# Patient Record
Sex: MALE | Race: WHITE | NOT HISPANIC OR LATINO | Employment: FULL TIME | URBAN - METROPOLITAN AREA
[De-identification: names, ages, dates, MRNs, and addresses within clinical notes are randomized per-mention and may not be internally consistent; named-entity substitution may affect disease eponyms.]

---

## 2021-08-20 ENCOUNTER — OFFICE VISIT (OUTPATIENT)
Dept: FAMILY MEDICINE CLINIC | Facility: CLINIC | Age: 61
End: 2021-08-20
Payer: COMMERCIAL

## 2021-08-20 VITALS
TEMPERATURE: 98.6 F | HEART RATE: 58 BPM | BODY MASS INDEX: 23.78 KG/M2 | WEIGHT: 151.5 LBS | SYSTOLIC BLOOD PRESSURE: 112 MMHG | DIASTOLIC BLOOD PRESSURE: 62 MMHG | HEIGHT: 67 IN | OXYGEN SATURATION: 99 %

## 2021-08-20 DIAGNOSIS — Z12.5 SCREENING FOR PROSTATE CANCER: ICD-10-CM

## 2021-08-20 DIAGNOSIS — Z13.0 SCREENING, ANEMIA, DEFICIENCY, IRON: ICD-10-CM

## 2021-08-20 DIAGNOSIS — M25.551 RIGHT HIP PAIN: Primary | ICD-10-CM

## 2021-08-20 DIAGNOSIS — Z13.220 SCREENING FOR LIPID DISORDERS: ICD-10-CM

## 2021-08-20 DIAGNOSIS — Z11.59 ENCOUNTER FOR HEPATITIS C SCREENING TEST FOR LOW RISK PATIENT: ICD-10-CM

## 2021-08-20 DIAGNOSIS — Z12.11 SCREEN FOR COLON CANCER: ICD-10-CM

## 2021-08-20 DIAGNOSIS — Z13.29 SCREENING FOR THYROID DISORDER: ICD-10-CM

## 2021-08-20 DIAGNOSIS — Z13.1 SCREENING FOR DIABETES MELLITUS: ICD-10-CM

## 2021-08-20 PROCEDURE — 1036F TOBACCO NON-USER: CPT | Performed by: FAMILY MEDICINE

## 2021-08-20 PROCEDURE — 3725F SCREEN DEPRESSION PERFORMED: CPT | Performed by: FAMILY MEDICINE

## 2021-08-20 PROCEDURE — 99203 OFFICE O/P NEW LOW 30 MIN: CPT | Performed by: FAMILY MEDICINE

## 2021-08-20 PROCEDURE — 3008F BODY MASS INDEX DOCD: CPT | Performed by: FAMILY MEDICINE

## 2021-08-20 RX ORDER — MULTIVITAMIN
1 TABLET ORAL DAILY
COMMUNITY

## 2021-08-20 NOTE — PROGRESS NOTES
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 ft of snow in winter , shoveling and doing farm work  pt feels is hip goes out which causes pain  accupuncture gives some relief   tens unit some relief   Dizziness     last couple days  fbw due   colonoscopy due  Patient ID: Jaida Nassar is a 62 y o  male  The pt is new to the office, here to establish care because he has had months of right hip pain radiating into the leg    Going on since the snow last winter  He was doing a lot of shoveling  He does a lot of farm work  [employer] like a joint is popping in and out  He has had acupuncture with minimal relief  TENS unit gives him some relief  Has done yoga/reiki    Hurts the most when he lies down and tried to do a leg raise - feels like the hip joint pops out  Has to "push it back in"  Hurts when he stands from a seated position in this same way  Exacerbated after driving for a while    He does not really have back pain  Sometimes has intermittent pains related to his work  Pain into the leg is not electric shock like, more achy      The following portions of the patient's history were reviewed and updated as appropriate: allergies, current medications, past family history, past medical history, past social history, past surgical history and problem list     Review of Systems          Objective:  Vitals:    08/20/21 1527   BP: 112/62   Pulse: 58   Temp: 98 6 °F (37 °C)   SpO2: 99%   Weight: 68 7 kg (151 lb 8 oz)   Height: 5' 6 5" (1 689 m)      Physical Exam  Constitutional:       General: He is not in acute distress  Appearance: Normal appearance  He is not ill-appearing     Musculoskeletal:      Comments:   No tenderness over the hip joint itself but when he goes to stand up he feels a click and you can see him hesitate, he feels like he has to push his hip anteriorly to get it to click and place   Skin: General: Skin is warm and dry  Findings: No erythema or rash  Neurological:      General: No focal deficit present  Mental Status: He is alert and oriented to person, place, and time  Cranial Nerves: No cranial nerve deficit  Gait: Gait normal    Psychiatric:         Mood and Affect: Mood normal          Behavior: Behavior normal          Thought Content: Thought content normal          Judgment: Judgment normal            Assessment/Plan:    No problem-specific Assessment & Plan notes found for this encounter  I am going to have the patient get an x-ray and go to physical therapy  If things are not getting better with physical therapy or there is something significantly abnormal on the x-ray I would refer him to Orthopedics  He is due for a physical and lab work which was ordered today  Diagnoses and all orders for this visit:    Right hip pain  -     XR hip/pelv 2-3 vws right if performed; Future  -     Ambulatory referral to Physical Therapy; Future    Screening, anemia, deficiency, iron  -     CBC and differential; Future  -     CBC and differential    Screening for diabetes mellitus  -     Comprehensive metabolic panel; Future  -     Comprehensive metabolic panel    Screening for lipid disorders  -     Lipid Panel with Direct LDL reflex; Future  -     Lipid Panel with Direct LDL reflex    Screening for thyroid disorder  -     TSH, 3rd generation with Free T4 reflex; Future  -     TSH, 3rd generation with Free T4 reflex    Encounter for hepatitis C screening test for low risk patient  -     Hepatitis C antibody; Future  -     Hepatitis C antibody    Screening for prostate cancer  -     PSA Total (Reflex To Free); Future  -     PSA Total (Reflex To Free)    Screen for colon cancer  -     Ambulatory referral for colonoscopy; Future    Other orders  -     Multiple Vitamin (multivitamin) tablet; Take 1 tablet by mouth daily  -     Probiotic Product (PRO-BIOTIC BLEND PO);  Take by mouth  -     MAGNESIUM PO; Take by mouth

## 2021-08-31 ENCOUNTER — EVALUATION (OUTPATIENT)
Dept: PHYSICAL THERAPY | Facility: CLINIC | Age: 61
End: 2021-08-31
Payer: COMMERCIAL

## 2021-08-31 DIAGNOSIS — M25.551 RIGHT HIP PAIN: Primary | ICD-10-CM

## 2021-08-31 PROCEDURE — 97161 PT EVAL LOW COMPLEX 20 MIN: CPT | Performed by: PHYSICAL THERAPIST

## 2021-08-31 PROCEDURE — 97110 THERAPEUTIC EXERCISES: CPT | Performed by: PHYSICAL THERAPIST

## 2021-08-31 NOTE — PROGRESS NOTES
PT Evaluation     Today's date: 2021  Patient name: Jorge Luis Wilson  : 1960  MRN: [id number]  Referring provider: Fede Pacheco MD  Dx:   Encounter Diagnosis     ICD-10-CM    1  Right hip pain  M25 551                   Assessment  Assessment details: Jorge Luis Wilson is a 62 y o  male who presents to physical therapy with diagnosis of right hip pain   Riddhi Bering presents with pain and limitations in range of motion, strength, joint mobility, flexibility, and functional ability  The current limitations are affecting Jens's ability to function at prior level  He will benefit from skilled physical therapy to address the current impairments and functional limitations to enable him to return to daily activities at maximal level  Thank you for the referral     Impairments: abnormal or restricted ROM, activity intolerance, impaired balance, impaired physical strength, lacks appropriate home exercise program, weight-bearing intolerance and poor posture     Symptom irritability: lowUnderstanding of Dx/Px/POC: good   Prognosis: good    Goals  Patient will decrease pain at worst to 4/10  Patient will decrease pain at worst to 2/10  Patient will improve hamstring 90/90 to 35 degrees on R  Patient will improve LE strength 1/2 grade in all deficit planes  Patient will report ability to complete ADLs and household chores without pain afterwards  Patient will report ability to stand after sitting for prolonged period without pain    Patient will be independent with comprehensive HEP    Plan  Patient would benefit from: skilled physical therapy  Planned modality interventions: cryotherapy and thermotherapy: hydrocollator packs  Planned therapy interventions: manual therapy, neuromuscular re-education, patient education, therapeutic activities, therapeutic exercise, therapeutic training and home exercise program  Frequency: 2x week  Duration in weeks: 6  Treatment plan discussed with: patient        Subjective Evaluation    History of Present Illness  Mechanism of injury: Chaka Oswald is a 62 y o  male presenting to physical therapy on 21 with primary complaints of R hip pain  He mentions it started in the winter  He states when he was doing a lot of shoveling, wearing heavy boots and walking through 3 feet of snow carrying things  He mentions looking back it couuld be from throwing leg over fences  Over the last few years he had random pain but since the winter it has been habitual      He mention he is able to complete his ADLs but he does have discomfort while doing so, especially when bending over to put on socks/shoes  Household chores/yardwork able to be completed but continues to have the annoyance in the hip  He mentions the pain is mainly after sitting for a prolonged period of time or driving when he stands up initially he has to press on the lateral aspect of the hip to "push it back in" once he starts moving he feels okay  He has about 3 hours of chores to do every morning and once he stops he feels achy and occasionally feels as though it is swollen  Able to complete steps in a reciprocal pattern, climbing a latter is a little more challenging  He mentions he uses a TENs unit at home which helps short term  In the spring/summer he was doing acupuncture, helped for 1-10 days  He mentions the pain always comes back  He mentions he can be woken up when he rolls over secondary to pain  Pain comes and goes in different locations, groin pain, glute pain, quad and hamstring  Patient denies any numbness / tingling into LE  Pain  Current pain ratin  At best pain ratin  At worst pain ratin  Quality: dull ache  Alleviating factors: TENs and Pressure on the area      Social Support  Steps to enter house: no  Stairs in house: yes (bedroom on ground floor)     Employment status: working (, 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)    Diagnostic Tests  No diagnostic tests performed  Treatments  Treatments tried: acupuncture  Patient Goals  Patient goals for therapy: decreased pain, independence with ADLs/IADLs, increased strength and return to sport/leisure activities  Patient goal: "get back to the point where he can be pain free and active again"        Objective    Gait: normal gait pattern  Palpation: TTP noted along R glute med, piriformis, greater trochanter, and IT band      R Hip AROM:  Flexion: 100 degrees  Abduction: 22 degrees  IR (90/90): 25 degrees  ER (90/90): 25 degrees    LE Strength: (L,R):  Hip Flexion: 5/5 , 4+/5 P! Hip Extension: 4+/5 , 4+/5  Hip Abduction: 4+/5 , 4-/5  Hip Adduction: 4+/5 , 4/5 P! Hip IR (90/90): 5/5 , 4+/5  Hip ER (90/90): 5/5 , 4+/5  Knee Extension: 5/5 , 5/5  Knee Flexion: 5/5 , 5/5  Ankle DF: 5/5 , 5/5      Flexibility:   Significant tightness in bilateral hip flexors, piriformis, and hamstrings R>L  90/90 L: 39 degrees, R: 22 degrees       Special Tests:  WILMA: Positive, P! Very limited motion  SCOUR: Positive, P!  L LAD: Relief of symptoms  Other:  After sitting, shifts weight to LLE to stand up, has P! In R hip         Precautions: none      Manuals 8/31            [place] R ITB, Piriformis, glute, Quad             L LAD                                       Neuro Re-Ed             TAC             TAC + Hip Add             TAC + Hip Abd             TAC + Bridge                          Side stepping                          Ther Ex             Bike              Clamshell 5 sec x 10            SLR 2 x 10            Seated HS Stretch 20 sec x 4            Prone HF Stretch w/ strap                                                    Ther Activity             Step Ups Fwd             Mini Squats                                       Gait Training                                       Modalities

## 2021-09-02 ENCOUNTER — OFFICE VISIT (OUTPATIENT)
Dept: PHYSICAL THERAPY | Facility: CLINIC | Age: 61
End: 2021-09-02
Payer: COMMERCIAL

## 2021-09-02 DIAGNOSIS — M25.551 RIGHT HIP PAIN: Primary | ICD-10-CM

## 2021-09-02 PROCEDURE — 97110 THERAPEUTIC EXERCISES: CPT | Performed by: PHYSICAL THERAPIST

## 2021-09-02 PROCEDURE — 97530 THERAPEUTIC ACTIVITIES: CPT | Performed by: PHYSICAL THERAPIST

## 2021-09-02 PROCEDURE — 97140 MANUAL THERAPY 1/> REGIONS: CPT | Performed by: PHYSICAL THERAPIST

## 2021-09-02 PROCEDURE — 97112 NEUROMUSCULAR REEDUCATION: CPT | Performed by: PHYSICAL THERAPIST

## 2021-09-02 NOTE — PROGRESS NOTES
Daily Note     Today's date: 2021  Patient name: Dick Tovar  : 1960  MRN: [id number]  Referring provider: Jose Alejandro Walls MD  Dx:   Encounter Diagnosis     ICD-10-CM    1  Right hip pain  M25 551                   Subjective: Patient reports no new complaints  Objective: See treatment diary below      Assessment: Patient tolerated treatment well  He demonstrates good technique for provided exercises after initial demonstration  He does have L hamstring cramping with bridges even with some adjustments to positioning and cuing, so held on completing  Trial again next visit if tolerated  Added TAC & prone HF stretch to HEP  He demonstrates muscular fatigue with provided program and would benefit from continued physical therapy  Plan: Continue per plan of care           Precautions: none      Manuals            [place] R ITB, Piriformis, glute, Quad  SK Sidelying           L LAD  SK                                     Neuro Re-Ed             TAC  5 sec x 10           TAC + Hip Add  Soccer B   5 sec  2 x 10           TAC + Hip Abd  OTB  5 sec  2 x 10           TAC + Bridge  L HS Cramp Trial again NV                        TB Side stepping                          Ther Ex             Bike   5 min           Clamshell 5 sec x 10 No Band  5 sec   2 x 10           SLR 2 x 10 2 x 10           Seated HS Stretch 20 sec x 4 20 sec x 4           Prone HF Stretch w/ strap  20 sec x 3           Slantboard Gastroc Stretch  20 sec x 3                                     Ther Activity             Step Ups Fwd  8 inch  1 x 10           Mini Squats  1 x 10                                     Gait Training                                       Modalities

## 2021-09-07 ENCOUNTER — APPOINTMENT (OUTPATIENT)
Dept: PHYSICAL THERAPY | Facility: CLINIC | Age: 61
End: 2021-09-07
Payer: COMMERCIAL

## 2021-09-09 ENCOUNTER — OFFICE VISIT (OUTPATIENT)
Dept: PHYSICAL THERAPY | Facility: CLINIC | Age: 61
End: 2021-09-09
Payer: COMMERCIAL

## 2021-09-09 DIAGNOSIS — M25.551 RIGHT HIP PAIN: Primary | ICD-10-CM

## 2021-09-09 PROCEDURE — 97140 MANUAL THERAPY 1/> REGIONS: CPT

## 2021-09-09 PROCEDURE — 97112 NEUROMUSCULAR REEDUCATION: CPT

## 2021-09-09 PROCEDURE — 97110 THERAPEUTIC EXERCISES: CPT

## 2021-09-09 NOTE — PROGRESS NOTES
Daily Note     Today's date: 2021  Patient name: Jennifer Antoine  : 1960  MRN: [id number]  Referring provider: Zeferino Smith MD  Dx:   Encounter Diagnosis     ICD-10-CM    1  Right hip pain  M25 551                   Subjective: Patient states he is feeling better overall but is still feeling       Objective: See treatment diary below      Assessment: Tolerated treatment well  Initiated POC onProgressed patient through TE today with good tolerance  Patient presents with moderate tenderness into piriformis muscle  Applied TPR and introduced piriformis stretch  Encouraged patient to be stretching daily at home  Patient demonstrated fatigue post treatment, exhibited good technique with therapeutic exercises and would benefit from continued PT      Plan: Continue per plan of care        Precautions: none      Manuals           [place] R ITB, Piriformis, glute, Quad  SK Sidelying RA          R LAD  SK RA          TPR piriformis   RA                       Neuro Re-Ed             TAC  5 sec x 10           TAC + Hip Add  Soccer B   5 sec  2 x 10 Soccer ball                        TAC + Bridge  L HS Cramp Trial again NV GTB 15                       TB Side stepping                          Ther Ex             Bike   5 min 5 min           Clamshell 5 sec x 10 No Band  5 sec   2 x 10 GTB 5 sec x 20           SLR 2 x 10 2 x 10 3x10           Seated HS Stretch 20 sec x 4 20 sec x 4 20 sec x 3           Prone HF Stretch w/ strap  20 sec x 3           Slantboard Gastroc Stretch  20 sec x 3 20 sec x 3           Piriformis stretch   20 sec x 3                        Ther Activity             Step Ups Fwd  8 inch  1 x 10 8" x 15           Mini Squats  1 x 10 15x                                     Gait Training                                       Modalities

## 2021-09-14 ENCOUNTER — OFFICE VISIT (OUTPATIENT)
Dept: PHYSICAL THERAPY | Facility: CLINIC | Age: 61
End: 2021-09-14
Payer: COMMERCIAL

## 2021-09-14 DIAGNOSIS — M25.551 RIGHT HIP PAIN: Primary | ICD-10-CM

## 2021-09-14 PROCEDURE — 97530 THERAPEUTIC ACTIVITIES: CPT

## 2021-09-14 PROCEDURE — 97110 THERAPEUTIC EXERCISES: CPT

## 2021-09-14 PROCEDURE — 97140 MANUAL THERAPY 1/> REGIONS: CPT

## 2021-09-16 ENCOUNTER — OFFICE VISIT (OUTPATIENT)
Dept: PHYSICAL THERAPY | Facility: CLINIC | Age: 61
End: 2021-09-16
Payer: COMMERCIAL

## 2021-09-16 DIAGNOSIS — M25.551 RIGHT HIP PAIN: Primary | ICD-10-CM

## 2021-09-16 PROCEDURE — 97110 THERAPEUTIC EXERCISES: CPT

## 2021-09-16 PROCEDURE — 97530 THERAPEUTIC ACTIVITIES: CPT

## 2021-09-16 PROCEDURE — 97140 MANUAL THERAPY 1/> REGIONS: CPT

## 2021-09-16 NOTE — PROGRESS NOTES
Daily Note     Today's date: 2021  Patient name: Esmer Silvestre  : 1960  MRN: [id number]  Referring provider: Joel Sheffield MD  Dx:   Encounter Diagnosis     ICD-10-CM    1  Right hip pain  M25 551                   Subjective: patient states that he is getting most of his pain with sitting in the car for over 30 min  Objective: See treatment diary below      Assessment: Tolerated treatment well  Patient arrived late to PT today therefore did not warm up on bike  Progressed through reps this visit  Patient demonstrated fatigue post treatment, exhibited good technique with therapeutic exercises and would benefit from continued PT      Plan: Continue per plan of care        Precautions: none      Manuals          [place] R ITB, Piriformis, glute, Quad  SK Sidelying RA RA Ra        R LAD  SK RA RA         TPR piriformis   RA RA RA                     Neuro Re-Ed             TAC  5 sec x 10  DC to HEP         TAC + Hip Add  Soccer B   5 sec  2 x 10 Soccer ball  soccerball 3x10 Soccer  5 sec x 30                     TAC + Bridge  L HS Cramp Trial again NV GTB 15 GTB 20 GTB 30                      TB Side stepping    GTB 3 laps  GTB 3 laps         3 way Hip /c theraband     GTB 30 ea GTB 30 ea        Ther Ex             Bike   5 min 5 min  5 min  NV         Clamshell 5 sec x 10 No Band  5 sec   2 x 10 GTB 5 sec x 20  GTB 5 sec x 20  GTB 5 sec x 30         SLR 2 x 10 2 x 10 3x10  3x10  30x        Seated HS Stretch 20 sec x 4 20 sec x 4 20 sec x 3  20 sec x 3  At home        Prone HF Stretch w/ strap  20 sec x 3  20 sec x 3  At home         Slantboard Gastroc Stretch  20 sec x 3 20 sec x 3  20 sec x 3  NV         Piriformis stretch   20 sec x 3  20 sec x 3  20 sec x 3                      Ther Activity             Step Ups Fwd  8 inch  1 x 10 8" x 15  8" x 20 8" x 30        Mini Squats  1 x 10 15x  20x  30x        Lateral step ups     20  8" x 30                     Gait Training Modalities

## 2021-09-21 ENCOUNTER — OFFICE VISIT (OUTPATIENT)
Dept: PHYSICAL THERAPY | Facility: CLINIC | Age: 61
End: 2021-09-21
Payer: COMMERCIAL

## 2021-09-21 DIAGNOSIS — M25.551 RIGHT HIP PAIN: Primary | ICD-10-CM

## 2021-09-21 PROCEDURE — 97530 THERAPEUTIC ACTIVITIES: CPT

## 2021-09-21 PROCEDURE — 97140 MANUAL THERAPY 1/> REGIONS: CPT

## 2021-09-21 PROCEDURE — 97110 THERAPEUTIC EXERCISES: CPT

## 2021-09-23 ENCOUNTER — OFFICE VISIT (OUTPATIENT)
Dept: PHYSICAL THERAPY | Facility: CLINIC | Age: 61
End: 2021-09-23
Payer: COMMERCIAL

## 2021-09-23 DIAGNOSIS — M25.551 RIGHT HIP PAIN: Primary | ICD-10-CM

## 2021-09-23 PROCEDURE — 97140 MANUAL THERAPY 1/> REGIONS: CPT

## 2021-09-23 PROCEDURE — 97112 NEUROMUSCULAR REEDUCATION: CPT

## 2021-09-23 PROCEDURE — 97110 THERAPEUTIC EXERCISES: CPT

## 2021-09-23 NOTE — PROGRESS NOTES
Daily Note     Today's date: 2021  Patient name: Kt Gonsales  : 1960  MRN: [id number]  Referring provider: Tarun Zuleta MD  Dx:   Encounter Diagnosis     ICD-10-CM    1  Right hip pain  M25 551                   Subjective:  Patient states adductors are still bothering him  Objective: See treatment diary below      Assessment: Tolerated treatment well  Encouraged patient to be icing at home in order to continue healing adductor strain  Progressed to blue theraband with all TE good tolerance  Patient demonstrated fatigue post treatment, exhibited good technique with therapeutic exercises and would benefit from continued PT      Plan: Continue per plan of care        Precautions: none      Manuals       [place] R ITB, Piriformis, glute, Quad  SK Sidelying RA RA Ra RA to adductors  RA to glutes      R LAD  SK RA RA         TPR piriformis   RA RA RA                     Neuro Re-Ed             TAC  5 sec x 10  DC to HEP         TAC + Hip Add  Soccer B   5 sec  2 x 10 Soccer ball  soccerball 3x10 Soccer  5 sec x 30 soccerball 5 sec x 30 soccerbal l5 sec x 30                   TAC + Bridge  L HS Cramp Trial again NV GTB 15 GTB 20 GTB 30         TB Monster walks        BTB 3 laps       TB Side stepping    GTB 3 laps  GTB 3 laps  GTB 3 laps  BTB 3 laps      3 way Hip /c theraband     GTB 30 ea GTB 30 ea GTB 30 ea BTB 30 ea       Ther Ex             Bike   5 min 5 min  5 min  NV  5 min  5 min       Clamshell 5 sec x 10 No Band  5 sec   2 x 10 GTB 5 sec x 20  GTB 5 sec x 20  GTB 5 sec x 30  GTB 5 se cx 30  BTB 5 sec x 30      SLR 2 x 10 2 x 10 3x10  3x10  30x 30x 30x      Seated HS Stretch 20 sec x 4 20 sec x 4 20 sec x 3  20 sec x 3  At home At home        Prone HF Stretch w/ strap  20 sec x 3  20 sec x 3  At home         [facility] Stretch  20 sec x 3 20 sec x 3  20 sec x 3  NV  20 sec x 3  NV       Piriformis stretch   20 sec x 3  20 sec x 3  20 sec x 3  20 sec x 3  20 sec x3      Hip adductor stretch with strap      20 sec  3  20 sec x 3       Ther Activity             Step Ups Fwd  8 inch  1 x 10 8" x 15  8" x 20 8" x 30 8"x 30  8" x 30       Mini Squats  1 x 10 15x  20x  30x 30x 30x      Lateral step ups     20  8" x 30 8" x 30  8" x 30       Leg press        80# 30      Gait Training                                       Modalities

## 2021-09-28 ENCOUNTER — OFFICE VISIT (OUTPATIENT)
Dept: PHYSICAL THERAPY | Facility: CLINIC | Age: 61
End: 2021-09-28
Payer: COMMERCIAL

## 2021-09-28 DIAGNOSIS — M25.551 RIGHT HIP PAIN: Primary | ICD-10-CM

## 2021-09-28 PROCEDURE — 97110 THERAPEUTIC EXERCISES: CPT

## 2021-09-28 PROCEDURE — 97140 MANUAL THERAPY 1/> REGIONS: CPT

## 2021-09-28 NOTE — PROGRESS NOTES
Daily Note     Today's date: 2021  Patient name: Gabriel Patino  : 1960  MRN: [id number]  Referring provider: Amador Elizabeth MD  Dx:   Encounter Diagnosis     ICD-10-CM    1  Right hip pain  M25 551                   Subjective: Patient states he is doing okay  Reports there are no new changes in symptoms  Reports he began to K-tape his groin and notes relief from that  Objective: See treatment diary below      Assessment: Tolerated treatment well  Patient arrived 10 min late to therapy therefore limited in TE today  Resumed with prescribed POC  Patient was noting he hasnt seen much progress with symptoms since start of PT  Encouraged patient to return back to the doctor if sxs presist  However, also educated that if it is a groin strain it could take minimum 6-8 weeks to heal  Patient noted he has not iced, encouraged him to be icing  Patient demonstrated fatigue post treatment, exhibited good technique with therapeutic exercises and would benefit from continued PT      Plan: Continue per plan of care        Precautions: none      Manuals       [place] T[place] R ITB, Piriformis, glute, Quad  SK Sidelying RA RA Ra RA to adductors  RA to glutes RA to glutes      R LAD  SK RA RA         TPR piriformis   RA RA RA                     Neuro Re-Ed             TAC  5 sec x 10  DC to HEP         TAC + Hip Add  Soccer B   5 sec  2 x 10 Soccer ball  soccerball 3x10 Soccer  5 sec x 30 soccerball 5 sec x 30 soccerbal l5 sec x 30 Soccer 5 sec x 30                  TAC + Bridge  L HS Cramp Trial again NV GTB 15 GTB 20 GTB 30         TB Monster walks        BTB 3 laps  BTB 3 laps      TB Side stepping    GTB 3 laps  GTB 3 laps  GTB 3 laps  BTB 3 laps BTB 3 laps      3 way Hip /c theraband     GTB 30 ea GTB 30 ea GTB 30 ea BTB 30 ea  BTB 30 ea     Ther Ex             Bike   5 min 5 min  5 min  NV  5 min  5 min  NV      Clamshell 5 sec x 10 No Band  5 sec   2 x 10 GTB 5 sec x 20 GTB 5 sec x 20  GTB 5 sec x 30  GTB 5 se cx 30  BTB 5 sec x 30 BTB 5 sec x 30      SLR 2 x 10 2 x 10 3x10  3x10  30x 30x 30x      Seated HS Stretch 20 sec x 4 20 sec x 4 20 sec x 3  20 sec x 3  At home At home        Prone HF Stretch w/ strap  20 sec x 3  20 sec x 3  At home         [facility] Stretch  20 sec x 3 20 sec x 3  20 sec x 3  NV  20 sec x 3  NV  NV      Piriformis stretch   20 sec x 3  20 sec x 3  20 sec x 3  20 sec x 3  20 sec x3 20 sec x 3      Hip adductor stretch with strap      20 sec  3  20 sec x 3  20 sec x 3      Ther Activity             Step Ups Fwd  8 inch  1 x 10 8" x 15  8" x 20 8" x 30 8"x 30  8" x 30  8"x 30     Mini Squats  1 x 10 15x  20x  30x 30x 30x 30x      Lateral step ups     20  8" x 30 8" x 30  8" x 30  8"x 30      Leg press        80# 30 80# 30      Gait Training                                       Modalities

## 2021-09-30 ENCOUNTER — APPOINTMENT (OUTPATIENT)
Dept: PHYSICAL THERAPY | Facility: CLINIC | Age: 61
End: 2021-09-30
Payer: COMMERCIAL

## 2022-11-16 ENCOUNTER — OFFICE VISIT (OUTPATIENT)
Dept: FAMILY MEDICINE CLINIC | Facility: CLINIC | Age: 62
End: 2022-11-16

## 2022-11-16 VITALS
HEART RATE: 68 BPM | OXYGEN SATURATION: 98 % | HEIGHT: 67 IN | DIASTOLIC BLOOD PRESSURE: 68 MMHG | BODY MASS INDEX: 26.12 KG/M2 | SYSTOLIC BLOOD PRESSURE: 110 MMHG | WEIGHT: 166.4 LBS | TEMPERATURE: 96.4 F

## 2022-11-16 DIAGNOSIS — Z13.220 SCREENING FOR LIPID DISORDERS: ICD-10-CM

## 2022-11-16 DIAGNOSIS — Z13.29 SCREENING FOR THYROID DISORDER: ICD-10-CM

## 2022-11-16 DIAGNOSIS — M25.50 ARTHRALGIA OF MULTIPLE JOINTS: ICD-10-CM

## 2022-11-16 DIAGNOSIS — Z12.12 SCREENING FOR COLORECTAL CANCER: ICD-10-CM

## 2022-11-16 DIAGNOSIS — Z13.29 SCREENING FOR ENDOCRINE DISORDER: ICD-10-CM

## 2022-11-16 DIAGNOSIS — Z11.59 NEED FOR HEPATITIS C SCREENING TEST: ICD-10-CM

## 2022-11-16 DIAGNOSIS — Z13.0 SCREENING FOR DEFICIENCY ANEMIA: ICD-10-CM

## 2022-11-16 DIAGNOSIS — Z12.5 SCREENING FOR PROSTATE CANCER: ICD-10-CM

## 2022-11-16 DIAGNOSIS — Z12.11 SCREENING FOR COLORECTAL CANCER: ICD-10-CM

## 2022-11-16 DIAGNOSIS — R53.82 CHRONIC FATIGUE: ICD-10-CM

## 2022-11-16 DIAGNOSIS — Z00.00 WELL ADULT EXAM: Primary | ICD-10-CM

## 2022-11-16 DIAGNOSIS — D22.9 ATYPICAL NEVI: ICD-10-CM

## 2022-11-16 NOTE — PROGRESS NOTES
Maykel Rivera is a 65 y o   male and is here for routine health maintenance  The patient reports no problems  History of Present Illness     Pt is here for a physical today  Family history of melanoma- father  No heart disease  Mother with diabetes  Had 2 colonoscopies- 10 years ago  Family history- colon cancer- mgm, pgm   Had lyme - 3 times in the past - first episode over 25 years ago  Well Adult Physical   Patient here for a comprehensive physical exam       Diet and Physical Activity  Diet: well balanced diet  Weight concerns: Patient is overweight (BMI 25 0-29  9)  Exercise: intermittently      Depression Screen  PHQ-2/9 Depression Screening    Little interest or pleasure in doing things: 0 - not at all  Feeling down, depressed, or hopeless: 0 - not at all  PHQ-2 Score: 0  PHQ-2 Interpretation: Negative depression screen          General Health  Hearing: Normal:  bilateral  Vision: no vision problems  Dental: regular dental visits      Cancer Screening  Colononoscopy due  PSA due for labs    Smoker NO   Annual screening with low-dose helical computed tomography (CT) for patients age 54 to 76 years with history of smoking at least 30 pack-years and, if a former smoker, had quit within the previous 15 years      The following portions of the patient's history were reviewed and updated as appropriate: allergies, current medications, past family history, past medical history, past social history, past surgical history and problem list     Review of Systems     Review of Systems   Constitutional: Negative  Negative for fatigue and fever  HENT: Negative  Eyes: Negative  Respiratory: Negative  Negative for cough  Cardiovascular: Negative  Gastrointestinal: Negative  Endocrine: Negative  Genitourinary: Negative  Musculoskeletal: Negative  Skin: Negative  Allergic/Immunologic: Negative  Neurological: Negative  Psychiatric/Behavioral: Negative  Past Medical History     Past Medical History:   Diagnosis Date   • RLS (restless legs syndrome)        Past Surgical History     Past Surgical History:   Procedure Laterality Date   • HERNIA REPAIR  2000       Social History     Social History     Socioeconomic History   • Marital status: /Civil Union     Spouse name: None   • Number of children: None   • Years of education: None   • Highest education level: None   Occupational History   • None   Tobacco Use   • Smoking status: Never   • Smokeless tobacco: Never   Vaping Use   • Vaping Use: Never used   Substance and Sexual Activity   • Alcohol use: Yes     Comment: limited    • Drug use: Never   • Sexual activity: None   Other Topics Concern   • None   Social History Narrative   • None     Social Determinants of Health     Financial Resource Strain: Not on file   Food Insecurity: Not on file   Transportation Needs: Not on file   Physical Activity: Not on file   Stress: Not on file   Social Connections: Not on file   Intimate Partner Violence: Not on file   Housing Stability: Not on file       Family History     Family History   Problem Relation Age of Onset   • Asthma Mother    • Osteoporosis Mother    • Diabetes Mother    • Thyroid disease Mother    • Seizures Daughter    • Stroke Daughter    • Melanoma Father    • Prostate cancer Father        Current Medications       Current Outpatient Medications:   •  MAGNESIUM PO, Take by mouth, Disp: , Rfl:   •  Multiple Vitamin (multivitamin) tablet, Take 1 tablet by mouth daily, Disp: , Rfl:   •  Probiotic Product (PRO-BIOTIC BLEND PO), Take by mouth, Disp: , Rfl:      Allergies     No Known Allergies    Objective     /68   Pulse 68   Temp (!) 96 4 °F (35 8 °C)   Ht 5' 6 5" (1 689 m)   Wt 75 5 kg (166 lb 6 4 oz)   SpO2 98%   BMI 26 46 kg/m²      Physical Exam  Vitals and nursing note reviewed  Constitutional:       Appearance: He is well-developed and well-nourished     HENT:      Head: Normocephalic  Right Ear: External ear normal       Left Ear: External ear normal       Nose: Nose normal       Mouth/Throat:      Mouth: Oropharynx is clear and moist    Eyes:      Conjunctiva/sclera: Conjunctivae normal       Pupils: Pupils are equal, round, and reactive to light  Cardiovascular:      Rate and Rhythm: Normal rate and regular rhythm  Pulses: Intact distal pulses  Heart sounds: Normal heart sounds  Pulmonary:      Effort: Pulmonary effort is normal       Breath sounds: Normal breath sounds  Abdominal:      General: Bowel sounds are normal       Palpations: Abdomen is soft  Musculoskeletal:      Cervical back: Normal range of motion and neck supple  Skin:     General: Skin is warm and dry  Neurological:      Mental Status: He is alert and oriented to person, place, and time  Psychiatric:         Mood and Affect: Mood and affect normal          Behavior: Behavior normal          Thought Content: Thought content normal          Judgment: Judgment normal            No results found  Health Maintenance     Health Maintenance   Topic Date Due   • Hepatitis C Screening  Never done   • Hepatitis B Vaccine (1 of 3 - 3-dose series) Never done   • HIV Screening  Never done   • BMI: Followup Plan  Never done   • Colorectal Cancer Screening  Never done   • COVID-19 Vaccine (1) 02/16/2023 (Originally 2/24/1961)   • Influenza Vaccine (1) 06/30/2023 (Originally 9/1/2022)   • Depression Screening  11/16/2023   • BMI: Adult  11/16/2023   • Annual Physical  11/16/2023   • DTaP,Tdap,and Td Vaccines (2 - Td or Tdap) 06/24/2024   • Pneumococcal Vaccine: Pediatrics (0 to 5 Years) and At-Risk Patients (6 to 59 Years)  Aged Out   • HIB Vaccine  Aged Out   • IPV Vaccine  Aged Out   • Hepatitis A Vaccine  Aged Out   • Meningococcal ACWY Vaccine  Aged Out   • HPV Vaccine  Aged [employer] History   Administered Date(s) Administered   • Tdap 06/24/2014       Assessment/Plan       1  Healthy male exam   2  Patient Counseling:   · Nutrition: Stressed importance of a well balanced diet, moderation of sodium/saturated fat, caloric balance and sufficient intake of fiber  · Exercise: Stressed the importance of regular exercise with a goal of 150 minutes per week  · Dental Health: Discussed daily flossing and brushing and regular dental visits     · Immunizations reviewed  · Discussed benefits of screening   · Discussed the patient's BMI with him  The BMI is above average; BMI management plan is completed  3  Cancer Screening   4  Labs   5  Blood work , colonoscopy  6  Follow up in one year      Radha Saba DO

## 2022-11-16 NOTE — PATIENT INSTRUCTIONS
Schedule colonoscopy  Fasting blood work - [facility] 109 Visit for Adults   AMBULATORY CARE:   A wellness visit  is when you see your healthcare provider to get screened for health problems  Your healthcare provider will also give you advice on how to stay healthy  Write down your questions so you remember to ask them  Ask your healthcare provider how often you should have a wellness visit  What happens at a wellness visit:  Your healthcare provider will ask about your health, and your family history of health problems  This includes high blood pressure, heart disease, and cancer  He or she will ask if you have symptoms that concern you, if you smoke, and about your mood  You may also be asked about your intake of medicines, supplements, food, and alcohol  Any of the following may be done: Your weight  will be checked  Your height may also be checked so your body mass index (BMI) can be calculated  Your BMI shows if you are at a healthy weight  Your blood pressure  and heart rate will be checked  Your temperature may also be checked  Blood and urine tests  may be done  Blood tests may be done to check your cholesterol levels  Abnormal cholesterol levels increase your risk for heart disease and stroke  You may also need a blood or urine test to check for diabetes if you are at increased risk  Urine tests may be done to look for signs of an infection or kidney disease  A physical exam  includes checking your heartbeat and lungs with a stethoscope  Your healthcare provider may also check your skin to look for sun damage  Screening tests  may be recommended  A screening test is done to check for diseases that may not cause symptoms  The screening tests you may need depend on your age, gender, family history, and lifestyle habits  For example, colorectal screening may be recommended if you are 48years old or older      Screening tests you need if you are a woman:   A Pap smear  is used to screen for cervical cancer  Pap smears are usually done every 3 to 5 years depending on your age  You may need them more often if you have had abnormal Pap smear test results in the past  Ask your healthcare provider how often you should have a Pap smear  A mammogram  is an x-ray of your breasts to screen for breast cancer  Experts recommend mammograms every 2 years starting at age 48 years  You may need a mammogram at age 52 years or younger if you have an increased risk for breast cancer  Talk to your healthcare provider about when you should start having mammograms and how often you need them  Vaccines you may need:   Get an influenza vaccine  every year  The influenza vaccine protects you from the flu  Several types of viruses cause the flu  The viruses change over time, so new vaccines are made each year  Get a tetanus-diphtheria (Td) booster vaccine  every 10 years  This vaccine protects you against tetanus and diphtheria  Tetanus is a severe infection that may cause painful muscle spasms and lockjaw  Diphtheria is a severe bacterial infection that causes a thick covering in the back of your mouth and throat  Get a human papillomavirus (HPV) vaccine  if you are female and aged 23 to 32 or male 23 to 24 and never received it  This vaccine protects you from HPV infection  HPV is the most common infection spread by sexual contact  HPV may also cause vaginal, penile, and anal cancers  Get a pneumococcal vaccine  if you are aged 72 years or older  The pneumococcal vaccine is an injection given to protect you from pneumococcal disease  Pneumococcal disease is an infection caused by pneumococcal bacteria  The infection may cause pneumonia, meningitis, or an ear infection  Get a shingles vaccine  if you are 60 or older, even if you have had shingles before  The shingles vaccine is an injection to protect you from the varicella-zoster virus  This is the same virus that causes chickenpox   Shingles is a painful rash that develops in people who had chickenpox or have been exposed to the virus  How to eat healthy:  My Plate is a model for planning healthy meals  It shows the types and amounts of foods that should go on your plate  Fruits and vegetables make up about half of your plate, and grains and protein make up the other half  A serving of dairy is included on the side of your plate  The amount of calories and serving sizes you need depends on your age, gender, weight, and height  Examples of healthy foods are listed below:  Eat a variety of vegetables  such as dark green, red, and orange vegetables  You can also include canned vegetables low in sodium (salt) and frozen vegetables without added butter or sauces  Eat a variety of fresh fruits , canned fruit in 100% juice, frozen fruit, and dried fruit  Include whole grains  At least half of the grains you eat should be whole grains  Examples include whole-wheat bread, wheat pasta, brown rice, and whole-grain cereals such as oatmeal     Eat a variety of protein foods such as seafood (fish and shellfish), lean meat, and poultry without skin (turkey and chicken)  Examples of lean meats include pork leg, shoulder, or tenderloin, and beef round, sirloin, tenderloin, and extra lean ground beef  Other protein foods include eggs and egg substitutes, beans, peas, soy products, nuts, and seeds  Choose low-fat dairy products such as skim or 1% milk or low-fat yogurt, cheese, and cottage cheese  Limit unhealthy fats  such as butter, hard margarine, and shortening  Exercise:  Exercise at least 30 minutes per day on most days of the week  Some examples of exercise include walking, biking, dancing, and swimming  You can also fit in more physical activity by taking the stairs instead of the elevator or parking farther away from stores  Include muscle strengthening activities 2 days each week  Regular exercise provides many health benefits   It helps you manage your weight, and decreases your risk for type 2 diabetes, heart disease, stroke, and high blood pressure  Exercise can also help improve your mood  Ask your healthcare provider about the best exercise plan for you  General health and safety guidelines:   Do not smoke  Nicotine and other chemicals in cigarettes and cigars can cause lung damage  Ask your healthcare provider for information if you currently smoke and need help to quit  E-cigarettes or smokeless tobacco still contain nicotine  Talk to your healthcare provider before you use these products  Limit alcohol  A drink of alcohol is 12 ounces of beer, 5 ounces of wine, or 1½ ounces of liquor  Lose weight, if needed  Being overweight increases your risk of certain health conditions  These include heart disease, high blood pressure, type 2 diabetes, and certain types of cancer  Protect your skin  Do not sunbathe or use tanning beds  Use sunscreen with a SPF 15 or higher  Apply sunscreen at least 15 minutes before you go outside  Reapply sunscreen every 2 hours  Wear protective clothing, hats, and sunglasses when you are outside  Drive safely  Always wear your seatbelt  Make sure everyone in your car wears a seatbelt  A seatbelt can save your life if you are in an accident  Do not use your cell phone when you are driving  This could distract you and cause an accident  Pull over if you need to make a call or send a text message  Practice safe sex  Use latex condoms if are sexually active and have more than one partner  Your healthcare provider may recommend screening tests for sexually transmitted infections (STIs)  Wear helmets, lifejackets, and protective gear  Always wear a helmet when you ride a bike or motorcycle, go skiing, or play sports that could cause a head injury  Wear protective equipment when you play sports  Wear a lifejacket when you are on a boat or doing water sports      © Copyright [employer] 2022 Information is for End User's use only and may not be sold, redistributed or otherwise used for commercial purposes  All illustrations and images included in CareNotes® are the copyrighted property of A D A M , Inc  or Jose A Montes  The above information is an  only  It is not intended as medical advice for individual conditions or treatments  Talk to your doctor, nurse or pharmacist before following any medical regimen to see if it is safe and effective for you

## 2022-11-28 PROBLEM — D22.9 ATYPICAL NEVI: Status: ACTIVE | Noted: 2022-11-28

## 2022-12-20 ENCOUNTER — TELEPHONE (OUTPATIENT)
Dept: FAMILY MEDICINE CLINIC | Facility: CLINIC | Age: 62
End: 2022-12-20

## 2022-12-20 NOTE — TELEPHONE ENCOUNTER
Confirmation Cathi Lopez [id number]  Effective: 12/20/2022     Expires: 03/20/2023  Active  Referred From  [address]  Group OYC: [id number]  Provider RB: [id number]  Tax JT: [id number]  [address] [place], [place] [address]  [id number], CX [id number]  Referred To  DERMATOLOGY AND MOHS SURGERY  Specialty: Not Available  Tier 1  Group TNC: [id number]  Provider IH: [id number]  Tax DH: [id number]  This referral is valid at any location for the above group  Patient Info  [address] [facility]  [id number]  Male  1960  [facility] Service  Office  Service Type  Medical Care  Diagnoses  5 V74 6 - neoplasm of uncertain behavior of skin  Procedures  None entered  Disclaimer  [facility] ([facility]) will pay for only those services covered under the [employer] which are specifically noted and requested by the PCP or OBGYN on the referral  If any additional services, testing, or follow-up care are required, the PCP or OBGYN must be contacted prior to the delivery of such additional services for written approval on a separate referral  Non-referred services will not be covered by [facility]  Benefits are underwritten or administered by [employer], a subsidiary of [employer], which are independent licensees of the [employer] and [employer]

## 2023-01-15 PROBLEM — Z00.00 WELL ADULT EXAM: Status: RESOLVED | Noted: 2022-11-16 | Resolved: 2023-01-15

## 2023-03-01 ENCOUNTER — HOSPITAL ENCOUNTER (OUTPATIENT)
Dept: RADIOLOGY | Facility: HOSPITAL | Age: 63
Discharge: HOME | End: 2023-03-01

## 2023-03-01 DIAGNOSIS — M25.551 RIGHT HIP PAIN: Primary | ICD-10-CM

## 2023-03-01 DIAGNOSIS — M25.551 RIGHT HIP PAIN: ICD-10-CM

## 2023-03-01 NOTE — PROGRESS NOTES
Ok per dr ortega to order xray , original order by Dr lujan patient never completed , patient requesting xray again still experiencing right hip pain    Order placed

## 2023-03-24 ENCOUNTER — OFFICE VISIT (OUTPATIENT)
Dept: OBGYN CLINIC | Facility: CLINIC | Age: 63
End: 2023-03-24

## 2023-03-24 VITALS
WEIGHT: 167 LBS | SYSTOLIC BLOOD PRESSURE: 126 MMHG | HEIGHT: 66 IN | BODY MASS INDEX: 26.84 KG/M2 | DIASTOLIC BLOOD PRESSURE: 72 MMHG

## 2023-03-24 DIAGNOSIS — M16.11 PRIMARY OSTEOARTHRITIS OF ONE HIP, RIGHT: Primary | ICD-10-CM

## 2023-03-24 DIAGNOSIS — M25.551 RIGHT HIP PAIN: ICD-10-CM

## 2023-03-24 NOTE — PROGRESS NOTES
Hip New Office Note    Assessment:     1  Primary osteoarthritis of one hip, right    2  Right hip pain        Plan:     Problem List Items Addressed This Visit    None  Visit Diagnoses     Primary osteoarthritis of one hip, right    -  Primary    Right hip pain              66 y/o male with right hip pain secondary to severe bone on bone OA of the right hip  Xrays reviewed today showing that he has severe bone on bone OA of the bilateral hips  On physical exam he has significantly decreased range of motion bilaterally  He has pain with ROM of the right hip, no significant pain on the left side  Discussed conservative treatment options to include cortisone injections, oral NSAIDs, Tylenol, activity modifications, physical therapy, and staying active with low impact exercises  Due to the severity of his OA, we do not feel that he would get much benefit from a cortisone injection  He does also have lumbar spine arthritis which may be contributing to some of his pain  Surgical procedure and recovery time of JEEVAN reviewed at length with patient and his wife today  Different approaches of JEEVAN were also reviewed, he is a candidate for anterior JEEVAN  Risks and benefits of surgery reviewed to include but not limited to bleeding, infection, damage to surrounding structures, hardware failure, instability, fracture, dislocation, leg length inequality, need for further surgery, continued pain, stiffness, blood clots, stroke, heart attack, and LFCN numbness was discussed with the patient  Patient will discuss with his wife at home his options and will follow up to discuss further  Subjective:     Patient ID: Santi Bello is a 65 y o  male  Chief Complaint:    Patient seen in consultation at the request of Maggi Sinha DO    HPI:  Patient is a 66 y/o male who presents today for an evaluation of his Right hip  He states that he has been having pain for several years, worsening progressively over the past 3 years  When he initially started with pain, he treated with PT, activity modifications, and exercise  Over the past 3 years his pain and motion of the hip have decreased  He has tried Turmeric for pain and inflammation  He has trouble with bending over and kneeling, as well as getting in and out of cars and putting on socks and shoes       Allergy:  No Known Allergies  Medications:  all current active meds have been reviewed  Past Medical History:  Past Medical History:   Diagnosis Date   • RLS (restless legs syndrome)      Past Surgical History:  Past Surgical History:   Procedure Laterality Date   • HERNIA REPAIR  2000     Family History:  Family History   Problem Relation Age of Onset   • Asthma Mother    • Osteoporosis Mother    • Diabetes Mother    • Thyroid disease Mother    • Seizures Daughter    • Stroke Daughter    • Melanoma Father    • Prostate cancer Father      Social History:  Social History     Substance and Sexual Activity   Alcohol Use Yes    Comment: limited      Social History     Substance and Sexual Activity   Drug Use Never     Social History     Tobacco Use   Smoking Status Never   Smokeless Tobacco Never           ROS:  General: Per HPI  Skin: Negative, except if noted below  HEENT: Negative  Respiratory: Negative  Cardiovascular: Negative  Gastrointestinal: Negative  Urinary: Negative  Vascular: Negative  Musculoskeletal: Positive per HPI   Neurologic: Positive per HPI  Endocrine: Negative    Objective:  BP Readings from Last 1 Encounters:   03/24/23 126/72      Wt Readings from Last 1 Encounters:   03/24/23 75 8 kg (167 lb)        Respiratory:   non-labored respirations    Lymphatics:  no palpable lymph nodes    Gait and Station:   antalgic    Neurologic:   Alert and oriented times 3  Patient with normal sensation except as noted below  Deep tendon reflexes 2+ except as noted in MSK exam    Bilateral Lower Extremity:  Left Hip     Inspection: skin intact    Range of Motion: significantly limited ROM wo pain    - log roll    - Trendelenburg sign    Motor: 5/5 IP/Q/HS/TA/GS    Pulses: 2+ DP / 2+ PT    SILT DP/SP/S/S/TN    Right Hip     Inspection: skin intact    Range of Motion: significantly limited ROM with pain    + log roll    - Trendelenburg sign    -SLR    Motor: 5/5 IP/Q/HS/TA/GS    Pulses: 2+ DP / 2+ PT    SILT DP/SP/S/S/TN    Imaging:  My interpretation XR AP pelvis/ Right hip: severe joint space narrowing, subchondral sclerosis, subchondral cysts, osteophyte formation  No fracture or dislocation  BMI:   Estimated body mass index is 26 95 kg/m² as calculated from the following:    Height as of this encounter: 5' 6" (1 676 m)  Weight as of this encounter: 75 8 kg (167 lb)  BSA:   Estimated body surface area is 1 85 meters squared as calculated from the following:    Height as of this encounter: 5' 6" (1 676 m)  Weight as of this encounter: 75 8 kg (167 lb)             Scribe Attestation    I,:  Samantha Martinez PA-C am acting as a scribe while in the presence of the attending physician :       I,:  Sarah Lopez DO personally performed the services described in this documentation    as scribed in my presence :

## 2023-04-26 ENCOUNTER — OFFICE VISIT (OUTPATIENT)
Dept: LAB | Facility: HOSPITAL | Age: 63
End: 2023-04-26

## 2023-04-26 DIAGNOSIS — M16.11 PRIMARY OSTEOARTHRITIS OF ONE HIP, RIGHT: ICD-10-CM

## 2023-04-26 LAB
ATRIAL RATE: 54 BPM
P AXIS: 44 DEGREES
PR INTERVAL: 166 MS
QRS AXIS: 12 DEGREES
QRSD INTERVAL: 88 MS
QT INTERVAL: 434 MS
QTC INTERVAL: 411 MS
T WAVE AXIS: 43 DEGREES
VENTRICULAR RATE: 54 BPM

## 2023-05-02 ENCOUNTER — TELEPHONE (OUTPATIENT)
Dept: OBGYN CLINIC | Facility: HOSPITAL | Age: 63
End: 2023-05-02

## 2023-05-02 NOTE — TELEPHONE ENCOUNTER
Preoperative Elective Admission Assessment    Living Situation:    Who does pt live with: spouse  What kind of home: single level  How do they enter the home: front  How many levels in home: 1   # of steps to enter home: 1  # of steps to second floor: n/a  Are there handrails: No  Are there landings: No  Sleeping arrangement: first/entry floor  Where is Bathroom: entry level  Where is the tub or shower: walk in shower without grab bars or shower chair  Dogs or ther pets[place]     First Floor Setup:   Is there a bathroom: Yes  Where would pt sleep: bed     DME: rolling walker and cane     Patient's Current Level of Function: Ambulates: Independently and ADLs: Independent    Post-op Caregiver: spouse  Caregiver Name and phone number for Inpatient discharge needs: Gerhardt Gitelman [phone]  Currently receive any HHC/aides/community supports: No     Post-op Transport: spouse  To/from hospital: spouse  To/from PT 2-3x/week: spouse  Uses community transport now: No     Outpatient Physical Therapy Site:  Site: Not ordered  pre and post-op appts scheduled? No     Medication Management: self and out of bottle  Preferred Pharmacy for Post-op Medications[facility] [place]  Blood Management Vitamin Regimen: Pt confirms taking as prescribed  Post-op anticoagulant: to be determined by surgical team postoperatively     DC Plan: Pt plans to be discharged home      Barriers to DC identified preoperatively: none identified    BMI: 26 95    Patient Education:  Pt educated on post-op pain, early mobilization (POD0), Average inpt LOS, OP PT goal   Patient educated that our goal is to appropriately discharge patient based off their post-op function while striving to maintain maximal independence  The goal is to discharge patient to home and for them to attend outpatient physical therapy      Assigned to care team? Yes

## 2023-05-03 ENCOUNTER — OFFICE VISIT (OUTPATIENT)
Dept: FAMILY MEDICINE CLINIC | Facility: CLINIC | Age: 63
End: 2023-05-03

## 2023-05-03 VITALS
HEART RATE: 55 BPM | SYSTOLIC BLOOD PRESSURE: 111 MMHG | WEIGHT: 170 LBS | BODY MASS INDEX: 27.44 KG/M2 | TEMPERATURE: 98 F | OXYGEN SATURATION: 99 % | DIASTOLIC BLOOD PRESSURE: 73 MMHG

## 2023-05-03 DIAGNOSIS — Z12.11 COLON CANCER SCREENING: ICD-10-CM

## 2023-05-03 DIAGNOSIS — Z01.818 PRE-OP EXAMINATION: Primary | ICD-10-CM

## 2023-05-03 DIAGNOSIS — Z13.220 LIPID SCREENING: ICD-10-CM

## 2023-05-03 DIAGNOSIS — M16.11 PRIMARY OSTEOARTHRITIS OF RIGHT HIP: ICD-10-CM

## 2023-05-03 DIAGNOSIS — Z11.59 NEED FOR HEPATITIS C SCREENING TEST: ICD-10-CM

## 2023-05-03 DIAGNOSIS — Z12.5 SCREENING FOR PROSTATE CANCER: ICD-10-CM

## 2023-05-03 DIAGNOSIS — R94.31 ABNORMAL EKG: ICD-10-CM

## 2023-05-03 NOTE — H&P (VIEW-ONLY)
Assessment/Plan:      1  Pre-op examination  Assessment & Plan: With abnormal ekg, pt has no comparison ekgs done in the past  Pt denies any cardiac history  Family history: father with cabgx3 in his 76s  We have no lipid panel although orders have been placed since 2021  Bp good  Pt will need echo and cardiac evaluation prior to surgery on 5/31   ADDENDUM: CARDIOLOGY EVALUATION COMPLETE: 5/4  PT AT LOW RISK FOR CARDIOVASCULAR COMPLICATIONS  ECHO DONE 5/12/2023- NORMAL EF 55% AND NO WALL MOTION ABNORMALITIES      2  Primary osteoarthritis of right hip    3  Abnormal EKG  Assessment & Plan: Will need further evaluation  Pt denies any symptoms  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  Pt will schedule echo and cardiology evaluation prior to upcoming surgery  ADDENDUM: PT HAD EVALUATION WITH CARDIOLOGY AND AN ECHO  THE ECHO WAS NORMAL AND PT HAD AN EVALUATION WITH CARDIOLOGY REVIEWED THE EKG AND HE IS AT LOW RISK FOR CARDIOVASCULAR COMPLICATIONS    Orders:  -     Echo complete w/ contrast if indicated; Future; Expected date: 05/03/2023  -     Ambulatory Referral to Cardiology; Future    4  Colon cancer screening  -     Ambulatory referral for colonoscopy; Future    5  Lipid screening  -     Lipid Panel with Direct LDL reflex    6  Screening for prostate cancer  -     PSA, total and free; Future  -     PSA, total and free    7  Need for hepatitis C screening test  -     Hepatitis C antibody; Future  -     Hepatitis C antibody        Subjective:  Chief Complaint   Patient presents with   • Pre-op Exam     Pre op may 31st right hip replacement 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   No symptoms  Review of Systems   Constitutional: Negative  Negative for fatigue and fever  HENT: Negative  Eyes: Negative  Respiratory: Negative  Negative for cough  Cardiovascular: Negative  Gastrointestinal: Negative  Endocrine: Negative  Genitourinary: Negative  Musculoskeletal: Positive for arthralgias and gait problem  Skin: Negative  Allergic/Immunologic: Negative  Psychiatric/Behavioral: Negative  The following portions of the patient's history were reviewed and updated as appropriate: allergies, current medications, past family history, past medical history, past social history, past surgical history and problem list     Objective:  Vitals:    05/03/23 0901   BP: 111/73   Pulse: 55   Temp: 98 °F (36 7 °C)   TempSrc: Tympanic   SpO2: 99%   Weight: 77 1 kg (170 lb)      Physical Exam  Vitals and nursing note reviewed  Constitutional:       Appearance: He is well-developed  HENT:      Head: Normocephalic and atraumatic  Cardiovascular:      Rate and Rhythm: Normal rate and regular rhythm  Heart sounds: Normal heart sounds  Pulmonary:      Effort: Pulmonary effort is normal       Breath sounds: Normal breath sounds  Abdominal:      General: Bowel sounds are normal       Palpations: Abdomen is soft  Musculoskeletal:         General: Tenderness present  No signs of injury  Comments: Tenderness over right greater trochanter and mild over left greater trochanter    Skin:     General: Skin is warm and dry  Neurological:      Mental Status: He is alert and oriented to person, place, and time  Psychiatric:         Behavior: Behavior normal          Thought Content:  Thought content normal          Judgment: Judgment normal

## 2023-05-03 NOTE — PROGRESS NOTES
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  Pt will schedule echo and cardiology evaluation prior to upcoming surgery  Orders:  -     Echo complete w/ contrast if indicated; Future; Expected date: 05/03/2023  -     Ambulatory Referral to Cardiology; Future    4  Colon cancer screening  -     Ambulatory referral for colonoscopy; Future    5  Lipid screening  -     Lipid Panel with Direct LDL reflex    6  Screening for prostate cancer  -     PSA, total and free; Future  -     PSA, total and free    7  Need for hepatitis C screening test  -     Hepatitis C antibody; Future  -     Hepatitis C antibody        Subjective:  Chief Complaint   Patient presents with    Pre-op Exam     Pre op may 31st right hip replacement [facility] no concerns         Patient ID: Keshawn Flank is a 65 y o  male  Pt is here for a pre-op evaluation  Has an abnormal ekg with no comparison  Denies any symptoms, now or in the past  No chest pain, shortness of breath  Bp is good  Cholesterol panel has been ordered but not done  Pt will get lipid panel prior to cardiology visit  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  No symptoms  Review of Systems   Constitutional: Negative  Negative for fatigue and fever  HENT: Negative  Eyes: Negative  Respiratory: Negative  Negative for cough  Cardiovascular: Negative  Gastrointestinal: Negative  Endocrine: Negative  Genitourinary: Negative  Musculoskeletal: Positive for arthralgias and gait problem  Skin: Negative  Allergic/Immunologic: Negative  Psychiatric/Behavioral: Negative  The following portions of the patient's history were reviewed and updated as appropriate: allergies, current medications, past family history, past medical history, past social history, past surgical history and problem list     Objective:  Vitals:    05/03/23 0901   BP: 111/73   Pulse: 55   Temp: 98 °F (36 7 °C)   TempSrc: Tympanic   SpO2: 99%   Weight: 77 1 kg (170 lb)      Physical Exam  Vitals and nursing note reviewed  Constitutional:       Appearance: He is well-developed  HENT:      Head: Normocephalic and atraumatic  Cardiovascular:      Rate and Rhythm: Normal rate and regular rhythm  Heart sounds: Normal heart sounds  Pulmonary:      Effort: Pulmonary effort is normal       Breath sounds: Normal breath sounds  Abdominal:      General: Bowel sounds are normal       Palpations: Abdomen is soft  Musculoskeletal:         General: Tenderness present  No signs of injury  Comments: Tenderness over right greater trochanter and mild over left greater trochanter    Skin:     General: Skin is warm and dry  Neurological:      Mental Status: He is alert and oriented to person, place, and time  Psychiatric:         Behavior: Behavior normal          Thought Content:  Thought content normal          Judgment: Judgment normal

## 2023-05-03 NOTE — ASSESSMENT & PLAN NOTE
With abnormal ekg, pt has no comparison ekgs done in the past  Pt denies any cardiac history  Family history: father with cabgx3 in his 76s  We have no lipid panel although orders have been placed since 2021  Bp good   Pt will need echo and cardiac evaluation prior to surgery on 5/31

## 2023-05-03 NOTE — PROGRESS NOTES
[facility] PRACTICE PRE-OPERATIVE EVALUATION  [facility] PHYSICIAN GROUP [facility]    BMI Counseling: Body mass index is 27 44 kg/m²  The BMI is above normal  Nutrition recommendations include decreasing portion sizes  Exercise recommendations include exercising 3-5 times per week  No pharmacotherapy was ordered  Rationale for BMI follow-up plan is due to patient being overweight or obese  Depression Screening and Follow-up Plan: Patient was screened for depression during today's encounter  They screened negative with a PHQ-2 score of 0  NAME: Roger Gooden  AGE: 65 y o  SEX: male  : 1960     DATE: 5/3/2023    [facility] Pre-Operative Evaluation      Chief Complaint: Pre-operative Evaluation     Surgery: right hip replacement  Anticipated Date of Surgery: 2023  Referring Provider: Self, Referral       History of Present Illness:     Roger Gooden is a 65 y o  male who presents to the office today for a preoperative consultation at the request of surgeon, Dr Elissa Alexander, who plans on performing total right hip replacement on 2023  Planned anesthesia is general  Patient has a bleeding risk of: no recent abnormal bleeding, no remote history of abnormal bleeding and no use of Ca-channel blockers  Patient does not have objections to receiving blood products if needed  Current anti-platelet/anti-coagulation medications that the patient is prescribed includes: none        Assessment of Chronic Conditions:   - abnormal ekg     Assessment of Cardiac Risk:  · Denies unstable or severe angina or MI in the last 6 weeks or history of stent placement in the last year   · Denies decompensated heart failure (e g  New onset heart failure, NYHA functional class IV heart failure, or worsening existing heart failure)  · Denies significant arrhythmias such as high grade AV block, symptomatic ventricular arrhythmia, newly recognized ventricular tachycardia, supraventricular tachycardia with resting heart rate >100, or symptomatic bradycardia  · Denies severe heart valve disease including aortic stenosis or symptomatic mitral stenosis     Exercise Capacity:  · Able to walk 4 blocks without symptoms?: Yes  · Able to walk 2 flights without symptoms?: Yes    Prior Anesthesia Reactions: No     Personal history of venous thromboembolic disease? No    History of steroid use for >2 weeks within last year? No         Review of Systems:     Review of Systems   Constitutional: Negative  Negative for fatigue and fever  HENT: Negative  Eyes: Negative  Respiratory: Negative  Negative for cough  Cardiovascular: Negative  Gastrointestinal: Negative  Endocrine: Negative  Genitourinary: Negative  Musculoskeletal: Positive for arthralgias and gait problem  Right hip pain   Skin: Negative  Allergic/Immunologic: Negative  Psychiatric/Behavioral: Negative          Current Problem List:     Patient Active Problem List   Diagnosis    Chronic fatigue    Arthralgia of multiple joints    Atypical nevi    Primary osteoarthritis of right hip    Pre-op examination       Allergies:     No Known Allergies    Current Medications:       Current Outpatient Medications:     ascorbic acid (VITAMIN C) 500 MG tablet, Take 1 tablet (500 mg total) by mouth 2 (two) times a day, Disp: 60 tablet, Rfl: 1    cholecalciferol (VITAMIN D3) 1,000 units tablet, Take 2 tablets (2,000 Units total) by mouth daily, Disp: 60 tablet, Rfl: 1    folic acid (FOLVITE) 1 mg tablet, Take 1 tablet (1 mg total) by mouth daily, Disp: 30 tablet, Rfl: 1    MAGNESIUM PO, Take by mouth, Disp: , Rfl:     Multiple Vitamin (multivitamin) tablet, Take 1 tablet by mouth daily, Disp: , Rfl:     Multiple Vitamins-Minerals (multivitamin with minerals) tablet, Take 1 tablet by mouth daily, Disp: 30 tablet, Rfl: 1    Probiotic Product (PRO-BIOTIC BLEND PO), Take by mouth, Disp: , Rfl:     Past Medical History:       Past Medical History:   Diagnosis Date    RLS (restless legs syndrome)         Past Surgical History:   Procedure Laterality Date    HERNIA REPAIR  2000        Family History   Problem Relation Age of Onset   [facility] Asthma Mother     Osteoporosis Mother     Diabetes Mother     Thyroid disease Mother     Seizures Daughter     Stroke Daughter     Melanoma Father     Prostate cancer Father         Social History     Socioeconomic History    Marital status: /Civil Union     Spouse name: Not on file    Number of children: Not on file    Years of education: Not on file    Highest education level: Not on file   Occupational History    Not on file   Tobacco Use    Smoking status: Never    Smokeless tobacco: Never   Vaping Use    Vaping Use: Never used   Substance and Sexual Activity    Alcohol use: Yes     Comment: limited     Drug use: Never    Sexual activity: Not on file   Other Topics Concern    Not on file   Social History Narrative    Not on file     Social Determinants of Health     Financial Resource Strain: Not on file   Food Insecurity: Not on file   Transportation Needs: Not on file   Physical Activity: Not on file   Stress: Not on file   Social Connections: Not on file   Intimate Partner Violence: Not on file   Housing Stability: Not on file        Physical Exam:     /73   Pulse 55   Temp 98 °F (36 7 °C) (Tympanic)   Wt 77 1 kg (170 lb)   SpO2 99%   BMI 27 44 kg/m²     Physical Exam  Vitals and nursing note reviewed  Constitutional:       Appearance: He is well-developed  HENT:      Head: Normocephalic and atraumatic  Right Ear: Tympanic membrane normal       Left Ear: Tympanic membrane normal       Nose: Nose normal    Cardiovascular:      Rate and Rhythm: Normal rate and regular rhythm  Heart sounds: Normal heart sounds  Pulmonary:      Effort: Pulmonary effort is normal       Breath sounds: Normal breath sounds     Abdominal:      General: Bowel sounds are normal  Palpations: Abdomen is soft  Musculoskeletal:         General: Tenderness present  No swelling  Right lower leg: No edema  Left lower leg: No edema  Comments: Tenderness over greater trochanter- right    Skin:     General: Skin is warm and dry  Neurological:      Mental Status: He is alert and oriented to person, place, and time  Psychiatric:         Behavior: Behavior normal          Thought Content: Thought content normal          Judgment: Judgment normal           Data:     Pre-operative work-up    Laboratory Results: I have personally reviewed the pertinent laboratory results/reports      EKG: I have personally reviewed pertinent reports  Chest x-ray: not indicated      Previous cardiopulmonary studies within the past year:  · Echocardiogram: ordered  · Cardiac Catheterization:   · Stress Test:   · Pulmonary Function Testing:       Assessment & Recommendations:     1  Pre-op examination        2  Primary osteoarthritis of right hip        3  Colon cancer screening  Ambulatory referral for colonoscopy      4  Abnormal EKG  Echo complete w/ contrast if indicated    Ambulatory Referral to Cardiology      5  Lipid screening  Lipid Panel with Direct LDL reflex      6  Screening for prostate cancer  PSA, total and free    PSA, total and free      7  Need for hepatitis C screening test  Hepatitis C antibody    Hepatitis C antibody          Pre-Op Evaluation Assessment  65 y o  male with planned surgery: right total hip replacement  Known risk factors for perioperative complications: None  Current medications which may produce withdrawal symptoms if withheld perioperatively: none  Pre-Op Evaluation Plan  1  Further preoperative workup as follows:   - Echocardiography to exclude impaired ventricular function    2  Medication Management/Recommendations:   - Not applicable, not on any medications    3   Prophylaxis for cardiac events with perioperative beta-blockers: not indicated  4  Patient requires further consultation with: Cardiology    Clearance  Patient is DENIED for surgery at this time  Further cardiac testing is required as outlined above       DO RUDDY Multani[address] [address]  Phone#  [phone]  Fax#  [phone]

## 2023-05-03 NOTE — ASSESSMENT & PLAN NOTE
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

## 2023-05-04 ENCOUNTER — OFFICE VISIT (OUTPATIENT)
Dept: CARDIOLOGY CLINIC | Facility: CLINIC | Age: 63
End: 2023-05-04

## 2023-05-04 VITALS
DIASTOLIC BLOOD PRESSURE: 66 MMHG | SYSTOLIC BLOOD PRESSURE: 100 MMHG | HEIGHT: 66 IN | BODY MASS INDEX: 27.35 KG/M2 | HEART RATE: 72 BPM | WEIGHT: 170.2 LBS

## 2023-05-04 DIAGNOSIS — R94.31 ABNORMAL EKG: ICD-10-CM

## 2023-05-04 DIAGNOSIS — Z01.810 PREOPERATIVE CARDIOVASCULAR EXAMINATION: Primary | ICD-10-CM

## 2023-05-04 LAB
CHOLEST SERPL-MCNC: 247 MG/DL (ref 100–199)
HCV AB S/CO SERPL IA: NON REACTIVE
HDLC SERPL-MCNC: 61 MG/DL
LDLC SERPL CALC-MCNC: 175 MG/DL (ref 0–99)
LDLC/HDLC SERPL: 2.9 RATIO (ref 0–3.6)
PSA FREE MFR SERPL: 38.5 %
PSA FREE SERPL-MCNC: 0.5 NG/ML
PSA SERPL-MCNC: 1.3 NG/ML (ref 0–4)
SL AMB VLDL CHOLESTEROL CALC: 11 MG/DL (ref 5–40)
TRIGL SERPL-MCNC: 68 MG/DL (ref 0–149)

## 2023-05-04 NOTE — PROGRESS NOTES
"                                           Cardiology Consultation     Janny Kirby  [id number]  1960  [facility] [address]  [facility]  [address] [facility] [address] [phone]    1  Preoperative cardiovascular examination        2  Abnormal EKG  Ambulatory Referral to Cardiology    POCT ECG        Discussion/Summary:    1  Preoperative cardiovascular risk assessment - Overall Willow Silva is low risk for any cardiovascular complications perioperatively  No changes were made to his medications and no preoperative cardiovascular testing is needed  2   Abnormal ECG - I do believe this represents Jens's \"normal\" however it is technically abnormal with poor R wave progression  An echocardiogram was ordered by his PCP we will review this  If this is unremarkable he only needs to see us back if needed  HPI:    Mr Brittney Campbell comes in for consultation as a preoperative cardiovascular risk for upcoming orthopedic surgery in which he is need of a total hip replacement  He has no cardiac history and has no apparent risk factors for cardiovascular disease  His father did have bypass surgery but at a later age, at 68  Last week a preoperative ECG was obtained which was abnormal   It was reported as sinus rhythm with possible prior anterior infarct  With reviewing this it appears more to just be poor R wave progression  However this led to consultation with us  Willow Silva does not convey any cardiac symptoms  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 other than his orthopedic problems  He has no chest pain or any symptoms of angina  He has no shortness of breath or any signs/symptoms of CHF  He denies palpitations, lightheadedness or syncope        Patient Active Problem List   Diagnosis    Chronic fatigue    Arthralgia of multiple joints    Atypical nevi    Primary osteoarthritis of right hip    Pre-op examination    " Abnormal EKG     Past Medical History:   Diagnosis Date    RLS (restless legs syndrome)      Social History     Socioeconomic History    Marital status: /Civil Union     Spouse name: Not on file    Number of children: Not on file    Years of education: Not on file    Highest education level: Not on file   Occupational History    Not on file   Tobacco Use    Smoking status: Never    Smokeless tobacco: Never   Vaping Use    Vaping Use: Never used   Substance and Sexual Activity    Alcohol use: Yes     Comment: limited     Drug use: Never    Sexual activity: Not on file   Other Topics Concern    Not on file   Social History Narrative    Not on file     Social Determinants of Health     Financial Resource Strain: Not on file   Food Insecurity: Not on file   Transportation Needs: Not on file   Physical Activity: Not on file   Stress: Not on file   Social Connections: Not on file   Intimate Partner Violence: Not on file   Housing Stability: Not on file      Family History   Problem Relation Age of Onset    Asthma Mother     Osteoporosis Mother     Diabetes Mother     Thyroid disease Mother     Seizures Daughter     Stroke Daughter     Melanoma Father     Prostate cancer Father      Past Surgical History:   Procedure Laterality Date    HERNIA REPAIR  2000       Current Outpatient Medications:     ascorbic acid (VITAMIN C) 500 MG tablet, Take 1 tablet (500 mg total) by mouth 2 (two) times a day, Disp: 60 tablet, Rfl: 1    cholecalciferol (VITAMIN D3) 1,000 units tablet, Take 2 tablets (2,000 Units total) by mouth daily, Disp: 60 tablet, Rfl: 1    folic acid (FOLVITE) 1 mg tablet, Take 1 tablet (1 mg total) by mouth daily, Disp: 30 tablet, Rfl: 1    MAGNESIUM PO, Take by mouth, Disp: , Rfl:     Multiple Vitamin (multivitamin) tablet, Take 1 tablet by mouth daily, Disp: , Rfl:     Multiple Vitamins-Minerals (multivitamin with minerals) tablet, Take 1 tablet by mouth daily, Disp: 30 tablet, "Rfl: 1    Probiotic Product (PRO-BIOTIC BLEND PO), Take by mouth, Disp: , Rfl:   No Known Allergies  Vitals:    05/04/23 1019   BP: 100/66   BP Location: Right arm   Patient Position: Sitting   Cuff Size: Standard   Pulse: 72   Weight: 77 2 kg (170 lb 3 2 oz)   Height: 5' 6\" (1 676 m)       Labs:  Lab Results   Component Value Date    K 5 1 04/26/2023     04/26/2023    CO2 26 04/26/2023    BUN 17 04/26/2023    CREATININE 0 86 04/26/2023     Lab Results   Component Value Date    WBC 7 2 04/26/2023    HGB 14 8 04/26/2023    HCT 45 1 04/26/2023    MCV 91 04/26/2023     04/26/2023     Imaging:  ECG today shows sinus bradycardia with poor R wave progression  Review of Systems:  Review of Systems   Constitutional: Negative  HENT: Negative  Eyes: Negative  Respiratory: Negative  Cardiovascular: Negative  Gastrointestinal: Negative  Musculoskeletal: Positive for arthralgias  Skin: Negative  Allergic/Immunologic: Negative  Neurological: Negative  Hematological: Negative  Psychiatric/Behavioral: Negative  All other systems reviewed and are negative  Vitals:    05/04/23 1019   BP: 100/66   BP Location: Right arm   Patient Position: Sitting   Cuff Size: Standard   Pulse: 72   Weight: 77 2 kg (170 lb 3 2 oz)   Height: 5' 6\" (1 676 m)       Physical Exam  Vitals and nursing note reviewed  Constitutional:       Appearance: He is well-developed  HENT:      Head: Normocephalic and atraumatic  Eyes:      General: No scleral icterus  Right eye: No discharge  Left eye: No discharge  Pupils: Pupils are equal, round, and reactive to light  Neck:      Thyroid: No thyromegaly  Vascular: No JVD  Cardiovascular:      Rate and Rhythm: Normal rate and regular rhythm  No extrasystoles are present  Pulses: Normal pulses  No decreased pulses  Heart sounds: Normal heart sounds, S1 normal and S2 normal  No murmur heard  No friction rub  No gallop   " Pulmonary:      Effort: Pulmonary effort is normal  No respiratory distress  Breath sounds: Normal breath sounds  No wheezing or rales  Abdominal:      General: Bowel sounds are normal  There is no distension  Palpations: Abdomen is soft  Tenderness: There is no abdominal tenderness  Musculoskeletal:         General: No tenderness or deformity  Normal range of motion  Cervical back: Normal range of motion and neck supple  Right lower leg: No edema  Left lower leg: No edema  Skin:     General: Skin is warm and dry  Findings: No rash  Neurological:      Mental Status: He is alert and oriented to person, place, and time  Cranial Nerves: No cranial nerve deficit  Psychiatric:         Thought Content: Thought content normal          Judgment: Judgment normal        Counseling / Coordination of Care  Total office time spent today 40 minutes  Greater than 50% of total time was spent with the patient and / or family counseling and / or coordination of care

## 2023-05-08 ENCOUNTER — TELEPHONE (OUTPATIENT)
Dept: FAMILY MEDICINE CLINIC | Facility: CLINIC | Age: 63
End: 2023-05-08

## 2023-05-08 NOTE — TELEPHONE ENCOUNTER
----- Message from Ade Urbina DO sent at 5/7/2023  1:54 PM EDT -----  Your cholesterol is very high  Your prostate level is normal

## 2023-05-12 ENCOUNTER — HOSPITAL ENCOUNTER (OUTPATIENT)
Dept: NON INVASIVE DIAGNOSTICS | Age: 63
Discharge: HOME | End: 2023-05-12

## 2023-05-12 ENCOUNTER — TELEPHONE (OUTPATIENT)
Dept: OBGYN CLINIC | Facility: CLINIC | Age: 63
End: 2023-05-12

## 2023-05-12 VITALS
BODY MASS INDEX: 27.32 KG/M2 | SYSTOLIC BLOOD PRESSURE: 118 MMHG | HEART RATE: 65 BPM | DIASTOLIC BLOOD PRESSURE: 78 MMHG | WEIGHT: 170 LBS | HEIGHT: 66 IN

## 2023-05-12 DIAGNOSIS — R94.31 ABNORMAL EKG: ICD-10-CM

## 2023-05-12 LAB
AORTIC ROOT: 2.7 CM
APICAL FOUR CHAMBER EJECTION FRACTION: 63 %
ASCENDING AORTA: 3.5 CM
DOP CALC LVOT AREA: 3.46 CM2
DOP CALC LVOT DIAMETER: 2.1 CM
E WAVE DECELERATION TIME: 265 MS
FRACTIONAL SHORTENING: 45 (ref 28–44)
INTERVENTRICULAR SEPTUM IN DIASTOLE (PARASTERNAL SHORT AXIS VIEW): 1.3 CM
INTERVENTRICULAR SEPTUM: 1.3 CM (ref 0.6–1.1)
LEFT ATRIUM AREA SYSTOLE SINGLE PLANE A4C: 15.5 CM2
LEFT ATRIUM SIZE: 3.4 CM
LEFT INTERNAL DIMENSION IN SYSTOLE: 2.7 CM (ref 2.1–4)
LEFT VENTRICULAR INTERNAL DIMENSION IN DIASTOLE: 4.9 CM (ref 3.5–6)
LEFT VENTRICULAR POSTERIOR WALL IN END DIASTOLE: 1 CM
LEFT VENTRICULAR STROKE VOLUME: 87 ML
LVSV (TEICH): 87 ML
MV E'TISSUE VEL-SEP: 9 CM/S
MV PEAK A VEL: 0.64 M/S
MV PEAK E VEL: 67 CM/S
MV STENOSIS PRESSURE HALF TIME: 77 MS
MV VALVE AREA P 1/2 METHOD: 2.86
RIGHT ATRIUM AREA SYSTOLE A4C: 13.3 CM2
RIGHT VENTRICLE ID DIMENSION: 3.7 CM
SL CV LV EF: 55
SL CV PED ECHO LEFT VENTRICLE DIASTOLIC VOLUME (MOD BIPLANE) 2D: 114 ML
SL CV PED ECHO LEFT VENTRICLE SYSTOLIC VOLUME (MOD BIPLANE) 2D: 27 ML
TRICUSPID ANNULAR PLANE SYSTOLIC EXCURSION: 2.4 CM

## 2023-05-12 NOTE — PRE-PROCEDURE INSTRUCTIONS
Pre-Surgery Instructions:   Medication Instructions   • ascorbic acid (VITAMIN C) 500 MG tablet Hold day of surgery  • cholecalciferol (VITAMIN D3) 1,000 units tablet Hold day of surgery  • folic acid (FOLVITE) 1 mg tablet Hold day of surgery  • MAGNESIUM PO Stop taking 7 days prior to surgery  • Multiple Vitamin (multivitamin) tablet Stop taking 7 days prior to surgery  • Multiple Vitamins-Minerals (multivitamin with minerals) tablet Hold day of surgery  • NON FORMULARY Stop taking 7 days prior to surgery  • Probiotic Product (PRO-BIOTIC BLEND PO) Stop taking 7 days prior to surgery  Medication instructions for day surgery reviewed  Please use only a sip of water to take your instructed medications  Avoid all over the counter vitamins, supplements and NSAIDS for one week prior to surgery per anesthesia guidelines  Tylenol is ok to take as needed  You will receive a call one business day prior to surgery with an arrival time and hospital directions  If your surgery is scheduled on a Monday, the hospital will be calling you on the Friday prior to your surgery  If you have not heard from anyone by 8pm, please call the hospital supervisor through the hospital  at [phone]  Skylar  [phone])  Do not eat or drink anything after midnight the night before your surgery, including candy, mints, lifesavers, or chewing gum  Do not drink alcohol 24hrs before your surgery  Try not to smoke at least 24hrs before your surgery  Follow the pre surgery showering instructions as listed in the [facility] Surgical Experience Booklet” or otherwise provided by your surgeon's office  Do not shave the surgical area 24 hours before surgery  Do not apply any lotions, creams, including makeup, cologne, deodorant, or perfumes after showering on the day of your surgery  No contact lenses, eye make-up, or artificial eyelashes   Remove nail polish, including gel polish, and any artificial, gel, or acrylic nails if possible  Remove all jewelry including rings and body piercing jewelry  Wear causal clothing that is easy to take on and off  Consider your type of surgery  Keep any valuables, jewelry, piercings at home  Please bring any specially ordered equipment (sling, braces) if indicated  Arrange for a responsible person to drive you to and from the hospital on the day of your surgery  Visitor Guidelines discussed  Call the surgeon's office with any new illnesses, exposures, or additional questions prior to surgery  Please reference your [facility] Surgical Experience Booklet” for additional information to prepare for your upcoming surgery

## 2023-05-12 NOTE — TELEPHONE ENCOUNTER
Caller: Patient    Doctor: Figueroa Gonzales    Reason for call: Pt is scheduled for RT JEEVAN on 5/31/23  He is wondering if he should be taking a baby aspirin starting now?   Please advise    Call back#: [phone]

## 2023-05-13 ENCOUNTER — HOSPITAL ENCOUNTER (EMERGENCY)
Facility: HOSPITAL | Age: 63
Discharge: HOME | End: 2023-05-13
Attending: EMERGENCY MEDICINE | Admitting: EMERGENCY MEDICINE

## 2023-05-13 ENCOUNTER — APPOINTMENT (EMERGENCY)
Dept: RADIOLOGY | Facility: HOSPITAL | Age: 63
End: 2023-05-13

## 2023-05-13 VITALS
BODY MASS INDEX: 27.44 KG/M2 | RESPIRATION RATE: 18 BRPM | DIASTOLIC BLOOD PRESSURE: 83 MMHG | OXYGEN SATURATION: 99 % | SYSTOLIC BLOOD PRESSURE: 151 MMHG | WEIGHT: 170 LBS | TEMPERATURE: 97.9 F | HEART RATE: 66 BPM

## 2023-05-13 DIAGNOSIS — S43.101A AC SEPARATION, RIGHT, INITIAL ENCOUNTER: Primary | ICD-10-CM

## 2023-05-13 NOTE — DISCHARGE INSTRUCTIONS
Intermittent cold packs     Tylenol or motrin (with food) may be taken for pain     Follow up with orthopedist DR Kaylie Leger or associate this week   Call Monday to schedule appointment    Return to ED for increased pain, worsening symptoms

## 2023-05-13 NOTE — ED PROVIDER NOTES
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 Taking?    MAGNESIUM PO   Yes No   Sig: Take by mouth   Multiple Vitamin (multivitamin) tablet   Yes No   Sig: Take 1 tablet by mouth daily   Multiple Vitamins-Minerals (multivitamin with minerals) tablet   No No   Sig: Take 1 tablet by mouth daily   NON FORMULARY   Yes No   Sig: Herbal Curcumin as needed for pain   Probiotic Product (PRO-BIOTIC BLEND PO)   Yes No   Sig: Take by mouth   ascorbic acid (VITAMIN C) 500 MG tablet   No No   Sig: Take 1 tablet (500 mg total) by mouth 2 (two) times a day   cholecalciferol (VITAMIN D3) 1,000 units tablet   No No   Sig: Take 2 tablets (2,000 Units total) by mouth daily   folic acid (FOLVITE) 1 mg tablet   No No   Sig: Take 1 tablet (1 mg total) by mouth daily      Facility-Administered Medications: None       Past Medical History:   Diagnosis Date   • History of COVID-19 01/2022    recovered at home   • Moderate exercise     \"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\"   • Right hip pain    • RLS (restless legs syndrome)    • " Wears glasses        Past Surgical History:   Procedure Laterality Date   • COLONOSCOPY     • HERNIA REPAIR  2000   • TONSILLECTOMY     • VASECTOMY         Family History   Problem Relation Age of Onset   • Asthma Mother    • Osteoporosis Mother    • Diabetes Mother    • Thyroid disease Mother    • Seizures Daughter    • Stroke Daughter    • Melanoma Father    • Prostate cancer Father      I have reviewed and agree with the history as documented  E-Cigarette/Vaping   • E-Cigarette Use Never User      E-Cigarette/Vaping Substances     Social History     Tobacco Use   • Smoking status: Never   • Smokeless tobacco: Never   Vaping Use   • Vaping Use: Never used   Substance Use Topics   • Alcohol use: Yes     Comment: limited    • Drug use: Never       Review of Systems   Constitutional: Negative for chills and fever  HENT: Negative for ear pain and sore throat  Respiratory: Negative for cough and shortness of breath  Cardiovascular: Negative for chest pain and palpitations  Gastrointestinal: Negative for abdominal pain, nausea and vomiting  Musculoskeletal: Positive for arthralgias  Negative for back pain and joint swelling  Skin: Negative for color change and rash  All other systems reviewed and are negative  Physical Exam  Physical Exam  Vitals and nursing note reviewed  Constitutional:       General: He is not in acute distress  Appearance: Normal appearance  He is not ill-appearing, toxic-appearing or diaphoretic  Eyes:      Extraocular Movements: Extraocular movements intact  Conjunctiva/sclera: Conjunctivae normal       Pupils: Pupils are equal, round, and reactive to light  Cardiovascular:      Rate and Rhythm: Normal rate and regular rhythm  Pulses: Normal pulses  Heart sounds: Normal heart sounds  Pulmonary:      Effort: Pulmonary effort is normal       Breath sounds: Normal breath sounds     Musculoskeletal:      Comments: Prominent right acromion compared to left acromion  There is tenderness of the right AC joint  There is pain in this area with range of motion of the right arm  The right upper extremity is neurovascularly intact  Skin:     General: Skin is warm and dry  Capillary Refill: Capillary refill takes less than 2 seconds  Neurological:      Mental Status: He is alert  Vital Signs  ED Triage Vitals [05/13/23 1044]   Temperature Pulse Respirations Blood Pressure SpO2   97 9 °F (36 6 °C) 59 18 (!) 172/85 100 %      Temp Source Heart Rate Source Patient Position - Orthostatic VS BP Location FiO2 (%)   Temporal Monitor Sitting Left arm --      Pain Score       7           Vitals:    05/13/23 1044 05/13/23 1230   BP: (!) 172/85 151/83   Pulse: 59 66   Patient Position - Orthostatic VS: Sitting Sitting         Visual Acuity  Visual Acuity    Flowsheet Row Most Recent Value   L Pupil Size (mm) 3   R Pupil Size (mm) 3          ED Medications  Medications - No data to display    Diagnostic Studies  Results Reviewed     None                 XR clavicle RIGHT   ED Interpretation by Sonam Yan PA-C (05/13 1120)   No acute fx     (+) R AC joint separation                 Procedures  Procedures         ED Course                               SBIRT 20yo+    Flowsheet Row Most Recent Value   Initial Alcohol Screen: US AUDIT-C     1  How often do you have a drink containing alcohol? 0 Filed at: 05/13/2023 1046   2  How many drinks containing alcohol do you have on a typical day you are drinking? 0 Filed at: 05/13/2023 1046   3a  Male UNDER 65: How often do you have five or more drinks on one occasion? 0 Filed at: 05/13/2023 1046   3b  FEMALE Any Age, or MALE 65+: How often do you have 4 or more drinks on one occassion? 0 Filed at: 05/13/2023 1046   Audit-C Score 0 Filed at: 05/13/2023 1046   JULIANA: How many times in the past year have you    Used an illegal drug or used a prescription medication for non-medical reasons?  Never Filed at: 05/13/2023 1046                    Medical Decision Making  71-year-old white male with pain, numbness and bony prominence at the right [facility] joint  This is more prominent than his baseline  I ordered a right clavicle x-ray  I interpreted as right AC joint separation  Shoulder sling was applied by me  Right arm neurovascular intact postplacement  Patient was advised to follow-up with [employer] orthopedist Dr Sara Littlejohn or associate this week  He was offered analgesia but declined  Return precautions given  Amount and/or Complexity of Data Reviewed  Radiology: ordered and independent interpretation performed  Disposition  Final diagnoses:   AC separation, right, initial encounter     Time reflects when diagnosis was documented in both MDM as applicable and the Disposition within this note     Time User Action Codes Description Comment    5/13/2023 12:15 PM Vega Licona Add [S43 101A] AC separation, right, initial encounter       ED Disposition     ED Disposition   Discharge    Condition   Stable    Date/Time   Sat May 13, 2023 12:15 PM    Comment   Elzie Schaumann discharge to home/self care                 Follow-up Information     Follow up With Specialties Details Why Ebenezer Elise Antonio 51, DO Orthopedic Surgery   [address]  [address] [address] [phone]            Discharge Medication List as of 5/13/2023 12:17 PM      CONTINUE these medications which have NOT CHANGED    Details   ascorbic acid (VITAMIN C) 500 MG tablet Take 1 tablet (500 mg total) by mouth 2 (two) times a day, Starting Fri 4/21/2023, Normal      cholecalciferol (VITAMIN D3) 1,000 units tablet Take 2 tablets (2,000 Units total) by mouth daily, Starting Fri 4/52/3938, Normal      folic acid (FOLVITE) 1 mg tablet Take 1 tablet (1 mg total) by mouth daily, Starting Fri 4/21/2023, Normal      MAGNESIUM PO Take by mouth, Historical Med      Multiple Vitamin (multivitamin) tablet Take 1 tablet by mouth daily, Historical Med      Multiple Vitamins-Minerals (multivitamin with minerals) tablet Take 1 tablet by mouth daily, Starting Fri 4/21/2023, Normal      NON FORMULARY Herbal Curcumin as needed for pain, Historical Med      Probiotic Product (PRO-BIOTIC BLEND PO) Take by mouth, Historical Med             No discharge procedures on file      PDMP Review     None          ED Provider  Electronically Signed by           Sheree Palencia PA-C  05/13/23 2521

## 2023-05-15 ENCOUNTER — TELEPHONE (OUTPATIENT)
Dept: FAMILY MEDICINE CLINIC | Facility: CLINIC | Age: 63
End: 2023-05-15

## 2023-05-15 NOTE — TELEPHONE ENCOUNTER
----- Message from Sary Canales DO sent at 5/12/2023  5:40 PM EDT -----  Your ultrasound of your heart is normal  Your heart function is good with no signs of damage

## 2023-05-16 ENCOUNTER — TELEPHONE (OUTPATIENT)
Dept: FAMILY MEDICINE CLINIC | Facility: CLINIC | Age: 63
End: 2023-05-16

## 2023-05-16 NOTE — TELEPHONE ENCOUNTER
Amy Prieto from [employer] called today requesting that the last office visit note be updated for clearance  His echo came back and results are in his chart  Please advise

## 2023-05-30 ENCOUNTER — ANESTHESIA EVENT (OUTPATIENT)
Dept: PERIOP | Facility: HOSPITAL | Age: 63
End: 2023-05-30

## 2023-05-31 ENCOUNTER — ANESTHESIA (OUTPATIENT)
Dept: PERIOP | Facility: HOSPITAL | Age: 63
End: 2023-05-31

## 2023-05-31 ENCOUNTER — APPOINTMENT (OUTPATIENT)
Dept: RADIOLOGY | Facility: HOSPITAL | Age: 63
End: 2023-05-31

## 2023-05-31 ENCOUNTER — HOSPITAL ENCOUNTER (OUTPATIENT)
Facility: HOSPITAL | Age: 63
Setting detail: OUTPATIENT SURGERY
Discharge: HOME | End: 2023-05-31
Attending: STUDENT IN AN ORGANIZED HEALTH CARE EDUCATION/TRAINING PROGRAM | Admitting: STUDENT IN AN ORGANIZED HEALTH CARE EDUCATION/TRAINING PROGRAM

## 2023-05-31 VITALS
SYSTOLIC BLOOD PRESSURE: 132 MMHG | RESPIRATION RATE: 12 BRPM | OXYGEN SATURATION: 99 % | HEIGHT: 66 IN | TEMPERATURE: 97.6 F | DIASTOLIC BLOOD PRESSURE: 75 MMHG | HEART RATE: 62 BPM | BODY MASS INDEX: 26.07 KG/M2 | WEIGHT: 162.2 LBS

## 2023-05-31 DIAGNOSIS — Z96.641 STATUS POST RIGHT HIP REPLACEMENT: Primary | ICD-10-CM

## 2023-05-31 LAB
ABO GROUP BLD: NORMAL
BLD GP AB SCN SERPL QL: NEGATIVE
RH BLD: NEGATIVE
SPECIMEN EXPIRATION DATE: NORMAL

## 2023-05-31 DEVICE — BIOLOX DELTA CERAMIC FEMORAL HEAD +1.5 36MM DIA 12/14 TAPER
Type: IMPLANTABLE DEVICE | Site: HIP | Status: FUNCTIONAL
Brand: BIOLOX DELTA

## 2023-05-31 DEVICE — ACTIS DUOFIX HIP PROSTHESIS (FEMORAL STEM 12/14 TAPER CEMENTLESS SIZE 6 STD COLLAR)  CE
Type: IMPLANTABLE DEVICE | Site: HIP | Status: FUNCTIONAL
Brand: ACTIS

## 2023-05-31 DEVICE — PINNACLE CANCELLOUS BONE SCREW 6.5MM X 25MM
Type: IMPLANTABLE DEVICE | Site: HIP | Status: FUNCTIONAL
Brand: PINNACLE

## 2023-05-31 DEVICE — PINNACLE POROCOAT ACETABULAR SHELL SECTOR II 52MM OD
Type: IMPLANTABLE DEVICE | Site: HIP | Status: FUNCTIONAL
Brand: PINNACLE POROCOAT

## 2023-05-31 DEVICE — PINNACLE HIP SOLUTIONS ALTRX POLYETHYLENE ACETABULAR LINER NEUTRAL 36MM ID 52MM OD
Type: IMPLANTABLE DEVICE | Site: HIP | Status: FUNCTIONAL
Brand: PINNACLE ALTRX

## 2023-05-31 RX ORDER — BUPIVACAINE HYDROCHLORIDE 7.5 MG/ML
INJECTION, SOLUTION INTRASPINAL AS NEEDED
Status: DISCONTINUED | OUTPATIENT
Start: 2023-05-31 | End: 2023-05-31

## 2023-05-31 RX ORDER — HYDROMORPHONE HCL IN WATER/PF 6 MG/30 ML
0.2 PATIENT CONTROLLED ANALGESIA SYRINGE INTRAVENOUS
Status: DISCONTINUED | OUTPATIENT
Start: 2023-05-31 | End: 2023-05-31 | Stop reason: HOSPADM

## 2023-05-31 RX ORDER — SODIUM CHLORIDE, SODIUM LACTATE, POTASSIUM CHLORIDE, CALCIUM CHLORIDE 600; 310; 30; 20 MG/100ML; MG/100ML; MG/100ML; MG/100ML
125 INJECTION, SOLUTION INTRAVENOUS CONTINUOUS
Status: DISCONTINUED | OUTPATIENT
Start: 2023-05-31 | End: 2023-05-31 | Stop reason: HOSPADM

## 2023-05-31 RX ORDER — ACETAMINOPHEN 325 MG/1
975 TABLET ORAL EVERY 8 HOURS
Status: DISCONTINUED | OUTPATIENT
Start: 2023-05-31 | End: 2023-05-31 | Stop reason: HOSPADM

## 2023-05-31 RX ORDER — SODIUM CHLORIDE, SODIUM LACTATE, POTASSIUM CHLORIDE, CALCIUM CHLORIDE 600; 310; 30; 20 MG/100ML; MG/100ML; MG/100ML; MG/100ML
75 INJECTION, SOLUTION INTRAVENOUS CONTINUOUS
Status: DISCONTINUED | OUTPATIENT
Start: 2023-05-31 | End: 2023-05-31 | Stop reason: HOSPADM

## 2023-05-31 RX ORDER — OXYCODONE HYDROCHLORIDE 5 MG/1
10 TABLET ORAL EVERY 4 HOURS PRN
Status: DISCONTINUED | OUTPATIENT
Start: 2023-05-31 | End: 2023-05-31 | Stop reason: HOSPADM

## 2023-05-31 RX ORDER — OXYCODONE HYDROCHLORIDE 5 MG/1
5 TABLET ORAL EVERY 4 HOURS PRN
Qty: 42 TABLET | Refills: 0 | Status: SHIPPED | OUTPATIENT
Start: 2023-05-31 | End: 2023-06-10

## 2023-05-31 RX ORDER — FENTANYL CITRATE 50 UG/ML
INJECTION, SOLUTION INTRAMUSCULAR; INTRAVENOUS AS NEEDED
Status: DISCONTINUED | OUTPATIENT
Start: 2023-05-31 | End: 2023-05-31

## 2023-05-31 RX ORDER — CEFAZOLIN SODIUM 2 G/50ML
2000 SOLUTION INTRAVENOUS ONCE
Status: COMPLETED | OUTPATIENT
Start: 2023-05-31 | End: 2023-05-31

## 2023-05-31 RX ORDER — MIDAZOLAM HYDROCHLORIDE 2 MG/2ML
INJECTION, SOLUTION INTRAMUSCULAR; INTRAVENOUS AS NEEDED
Status: DISCONTINUED | OUTPATIENT
Start: 2023-05-31 | End: 2023-05-31

## 2023-05-31 RX ORDER — DEXAMETHASONE SODIUM PHOSPHATE 10 MG/ML
INJECTION, SOLUTION INTRAMUSCULAR; INTRAVENOUS AS NEEDED
Status: DISCONTINUED | OUTPATIENT
Start: 2023-05-31 | End: 2023-05-31 | Stop reason: HOSPADM

## 2023-05-31 RX ORDER — HYDROMORPHONE HCL/PF 1 MG/ML
0.5 SYRINGE (ML) INJECTION
Status: DISCONTINUED | OUTPATIENT
Start: 2023-05-31 | End: 2023-05-31 | Stop reason: HOSPADM

## 2023-05-31 RX ORDER — TRANEXAMIC ACID 10 MG/ML
1000 INJECTION, SOLUTION INTRAVENOUS ONCE
Status: COMPLETED | OUTPATIENT
Start: 2023-05-31 | End: 2023-05-31

## 2023-05-31 RX ORDER — CHLORHEXIDINE GLUCONATE 4 G/100ML
SOLUTION TOPICAL DAILY PRN
Status: DISCONTINUED | OUTPATIENT
Start: 2023-05-31 | End: 2023-05-31 | Stop reason: HOSPADM

## 2023-05-31 RX ORDER — ACETAMINOPHEN 325 MG/1
975 TABLET ORAL ONCE
Status: COMPLETED | OUTPATIENT
Start: 2023-05-31 | End: 2023-05-31

## 2023-05-31 RX ORDER — SENNA AND DOCUSATE SODIUM 50; 8.6 MG/1; MG/1
1 TABLET, FILM COATED ORAL DAILY
Qty: 14 TABLET | Refills: 0 | Status: SHIPPED | OUTPATIENT
Start: 2023-05-31

## 2023-05-31 RX ORDER — OXYCODONE HYDROCHLORIDE 5 MG/1
5 TABLET ORAL EVERY 4 HOURS PRN
Status: DISCONTINUED | OUTPATIENT
Start: 2023-05-31 | End: 2023-05-31 | Stop reason: HOSPADM

## 2023-05-31 RX ORDER — ASPIRIN 81 MG/1
81 TABLET, CHEWABLE ORAL 2 TIMES DAILY
Qty: 60 TABLET | Refills: 0 | Status: SHIPPED | OUTPATIENT
Start: 2023-05-31

## 2023-05-31 RX ORDER — ACETAMINOPHEN 500 MG
1000 TABLET ORAL EVERY 8 HOURS
Qty: 60 TABLET | Refills: 1 | Status: SHIPPED | OUTPATIENT
Start: 2023-05-31

## 2023-05-31 RX ORDER — ONDANSETRON 4 MG/1
4 TABLET, FILM COATED ORAL EVERY 8 HOURS PRN
Qty: 20 TABLET | Refills: 0 | Status: SHIPPED | OUTPATIENT
Start: 2023-05-31

## 2023-05-31 RX ORDER — BUPIVACAINE HYDROCHLORIDE AND EPINEPHRINE 2.5; 5 MG/ML; UG/ML
INJECTION, SOLUTION EPIDURAL; INFILTRATION; INTRACAUDAL; PERINEURAL AS NEEDED
Status: DISCONTINUED | OUTPATIENT
Start: 2023-05-31 | End: 2023-05-31 | Stop reason: HOSPADM

## 2023-05-31 RX ORDER — ONDANSETRON 2 MG/ML
4 INJECTION INTRAMUSCULAR; INTRAVENOUS ONCE AS NEEDED
Status: DISCONTINUED | OUTPATIENT
Start: 2023-05-31 | End: 2023-05-31 | Stop reason: HOSPADM

## 2023-05-31 RX ORDER — ONDANSETRON 2 MG/ML
INJECTION INTRAMUSCULAR; INTRAVENOUS AS NEEDED
Status: DISCONTINUED | OUTPATIENT
Start: 2023-05-31 | End: 2023-05-31

## 2023-05-31 RX ORDER — CEFAZOLIN SODIUM 2 G/50ML
2000 SOLUTION INTRAVENOUS EVERY 8 HOURS
Status: DISCONTINUED | OUTPATIENT
Start: 2023-05-31 | End: 2023-05-31 | Stop reason: HOSPADM

## 2023-05-31 RX ORDER — METOCLOPRAMIDE HYDROCHLORIDE 5 MG/ML
10 INJECTION INTRAMUSCULAR; INTRAVENOUS ONCE AS NEEDED
Status: DISCONTINUED | OUTPATIENT
Start: 2023-05-31 | End: 2023-05-31 | Stop reason: HOSPADM

## 2023-05-31 RX ORDER — LIDOCAINE HYDROCHLORIDE 10 MG/ML
INJECTION, SOLUTION EPIDURAL; INFILTRATION; INTRACAUDAL; PERINEURAL AS NEEDED
Status: DISCONTINUED | OUTPATIENT
Start: 2023-05-31 | End: 2023-05-31

## 2023-05-31 RX ORDER — CELECOXIB 100 MG/1
100 CAPSULE ORAL 2 TIMES DAILY
Qty: 60 CAPSULE | Refills: 0 | Status: SHIPPED | OUTPATIENT
Start: 2023-05-31

## 2023-05-31 RX ORDER — LIDOCAINE HYDROCHLORIDE 10 MG/ML
0.5 INJECTION, SOLUTION EPIDURAL; INFILTRATION; INTRACAUDAL; PERINEURAL ONCE AS NEEDED
Status: DISCONTINUED | OUTPATIENT
Start: 2023-05-31 | End: 2023-05-31 | Stop reason: HOSPADM

## 2023-05-31 RX ORDER — KETOROLAC TROMETHAMINE 30 MG/ML
INJECTION, SOLUTION INTRAMUSCULAR; INTRAVENOUS AS NEEDED
Status: DISCONTINUED | OUTPATIENT
Start: 2023-05-31 | End: 2023-05-31 | Stop reason: HOSPADM

## 2023-05-31 RX ORDER — DIPHENHYDRAMINE HYDROCHLORIDE 50 MG/ML
12.5 INJECTION INTRAMUSCULAR; INTRAVENOUS ONCE AS NEEDED
Status: DISCONTINUED | OUTPATIENT
Start: 2023-05-31 | End: 2023-05-31 | Stop reason: HOSPADM

## 2023-05-31 RX ORDER — MAGNESIUM HYDROXIDE 1200 MG/15ML
LIQUID ORAL AS NEEDED
Status: DISCONTINUED | OUTPATIENT
Start: 2023-05-31 | End: 2023-05-31 | Stop reason: HOSPADM

## 2023-05-31 RX ORDER — CHLORHEXIDINE GLUCONATE 0.12 MG/ML
15 RINSE ORAL ONCE
Status: COMPLETED | OUTPATIENT
Start: 2023-05-31 | End: 2023-05-31

## 2023-05-31 RX ORDER — CEFADROXIL 500 MG/1
500 CAPSULE ORAL EVERY 12 HOURS SCHEDULED
Qty: 10 CAPSULE | Refills: 0 | Status: SHIPPED | OUTPATIENT
Start: 2023-05-31 | End: 2023-06-05

## 2023-05-31 RX ORDER — FENTANYL CITRATE/PF 50 MCG/ML
50 SYRINGE (ML) INJECTION
Status: DISCONTINUED | OUTPATIENT
Start: 2023-05-31 | End: 2023-05-31 | Stop reason: HOSPADM

## 2023-05-31 RX ORDER — PROPOFOL 10 MG/ML
INJECTION, EMULSION INTRAVENOUS CONTINUOUS PRN
Status: DISCONTINUED | OUTPATIENT
Start: 2023-05-31 | End: 2023-05-31

## 2023-05-31 RX ADMIN — FENTANYL CITRATE 50 MCG: 50 INJECTION INTRAMUSCULAR; INTRAVENOUS at 09:58

## 2023-05-31 RX ADMIN — PROPOFOL 50 MCG/KG/MIN: 10 INJECTION, EMULSION INTRAVENOUS at 10:04

## 2023-05-31 RX ADMIN — MIDAZOLAM 1 MG: 1 INJECTION INTRAMUSCULAR; INTRAVENOUS at 09:48

## 2023-05-31 RX ADMIN — CEFAZOLIN SODIUM 2000 MG: 2 SOLUTION INTRAVENOUS at 10:00

## 2023-05-31 RX ADMIN — FENTANYL CITRATE 35 MCG: 50 INJECTION INTRAMUSCULAR; INTRAVENOUS at 09:48

## 2023-05-31 RX ADMIN — SODIUM CHLORIDE, SODIUM LACTATE, POTASSIUM CHLORIDE, AND CALCIUM CHLORIDE 125 ML/HR: .6; .31; .03; .02 INJECTION, SOLUTION INTRAVENOUS at 08:56

## 2023-05-31 RX ADMIN — ACETAMINOPHEN 975 MG: 325 TABLET, FILM COATED ORAL at 08:55

## 2023-05-31 RX ADMIN — MIDAZOLAM 1 MG: 1 INJECTION INTRAMUSCULAR; INTRAVENOUS at 09:49

## 2023-05-31 RX ADMIN — LIDOCAINE HYDROCHLORIDE 50 MG: 10 INJECTION, SOLUTION EPIDURAL; INFILTRATION; INTRACAUDAL; PERINEURAL at 10:04

## 2023-05-31 RX ADMIN — ONDANSETRON 4 MG: 2 INJECTION INTRAMUSCULAR; INTRAVENOUS at 11:08

## 2023-05-31 RX ADMIN — TRANEXAMIC ACID 1000 MG: 10 INJECTION, SOLUTION INTRAVENOUS at 08:56

## 2023-05-31 RX ADMIN — FENTANYL CITRATE 15 MCG: 50 INJECTION, SOLUTION INTRAMUSCULAR; INTRAVENOUS at 09:56

## 2023-05-31 RX ADMIN — CHLORHEXIDINE GLUCONATE 15 ML: 1.2 RINSE ORAL at 08:56

## 2023-05-31 RX ADMIN — BUPIVACAINE HYDROCHLORIDE IN DEXTROSE 1.4 ML: 7.5 INJECTION, SOLUTION SUBARACHNOID at 09:56

## 2023-05-31 NOTE — OCCUPATIONAL THERAPY NOTE
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\"    Right hip pain     RLS (restless legs syndrome)     Wears glasses      Past Surgical History  Past Surgical History:   Procedure Laterality Date    COLONOSCOPY      HERNIA REPAIR  2000    TONSILLECTOMY      VASECTOMY               05/31/23 1528   OT Last Visit   OT Visit Date 05/31/23   Note Type   Note type Evaluation   Pain Assessment   Pain Assessment Tool 0-10   Pain Score 3   Pain Location/Orientation Orientation: Right;Location: Hip   Patient's Stated Pain Goal No pain   Hospital Pain Intervention(s) Cold applied; Ambulation/increased activity   Restrictions/Precautions   Weight Bearing Precautions Per Order Yes   RLE Weight Bearing Per Order WBAT   Other Precautions THR  (Anterior approach)   Home Living   Type of [address] [place] [address] One level  (1 LEVON)   Bathroom Shower/Tub Walk-in shower   Bathroom Equipment Grab bars in shower; Shower chair   Home Equipment Walker   Prior Function   Level of [place] Independent with ADLs; Independent with functional mobility; Independent with IADLS   Lives With Spouse   Receives Help From Family   IADLs Independent with driving; Independent with meal prep; Independent with medication management   Falls in the last 6 months 0   Vocational Full time employment   Lifestyle   Autonomy Independent at baseline   Reciprocal Relationships Lives with spouse, has two daughters  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, cattle, chickens   Intrinsic Gratification Farming - both a hobby & work   General   Family/Caregiver Present No   Subjective   Subjective \"I'll be good!'   ADL   Eating Assistance 7  Independent   Grooming Assistance 7  " Independent   UB Bathing Assistance 7  Independent   LB Bathing Assistance 5  Supervision/Setup   [place] 5  Supervision/Setup   LB Dressing Deficit Don/doff R shoe;Don/doff L shoe  (Sitting EOB)   Toileting Assistance  5  Supervision/Setup   Toileting Deficit   (Urinal in stance)   Additional Comments See tx session for progression of ADLs   Bed Mobility   Supine to Sit 7  Independent   Transfers   Sit to Stand 6  Modified independent   Additional items Armrests   Stand to Sit 6  Modified independent   Additional items Armrests   Additional Comments Pt able to ascend/descend 2 simulated standard height steps with S & rails   Functional Mobility   Functional Mobility 6  Modified independent   Additional Comments Greater than functional household distance   Additional items Rolling walker   Balance   Static Sitting Normal   Dynamic Sitting Normal   Static Standing Normal   Dynamic Standing Good   Ambulatory Good   Activity Tolerance   Activity Tolerance Patient tolerated treatment well   Medical Staff Made Aware PT Li   Nurse Made Aware BRANDY Guerrero   RUANGELY Assessment   RUE Assessment WFL   LUE Assessment   LUE Assessment WFL   Hand Function   Gross Motor Coordination Functional   Vision-Basic Assessment   Current Vision Wears glasses all the time   Cognition   Overall Cognitive Status WFL   Arousal/Participation Alert   Attention Within functional limits   Orientation Level Oriented X4   Memory Within functional limits   Following Commands Follows all commands and directions without difficulty   Assessment   Prognosis Good   Assessment Pt is a 65 y o  male seen for OT evaluation at [address] [place] [address], admitted 5/31/2023 for THR  Pt is POD#0 R THR with anterior approach  OT completed expanded review of pt's medical and social history   Comorbidities affecting pt's functional performance at time of assessment include: chronic fatigue & arthralgia of multiple joints  Prior to admission, pt was living with his spouse in a ranch with 1 LEVON  Pt was independent with ADL/IADLs/Mobility  Upon evaluation, pt presents to OT at functional baseline with use of RW for mobility  Based on findings, pt is of moderate complexity  The patient's raw score on the AM-PAC Daily Activity inpatient short form is 24, standardized score is 57 54, greater than 39 4  Patients at this level are likely to benefit from DC to home  Please refer to the recommendation of the Occupational Therapist for safe DC planning  At this time, OT recommendations at time of discharge are DC with level 4 resources  No further acute OT needs indicated at this time - Recommend pt continue to be OOB for meals, ambulation to/from BR, perform self care tasks, and mobility in hallway with nursing  D/C from OT caseload with above recommendations  Goals   Patient Goals Pt wants to go home   Plan   OT Frequency Eval only   Recommendation   UB Rehab Discharge Recommendation (PT/OT) Level 4   AM-PAC Daily Activity Inpatient   Lower Body Dressing 4   Bathing 4   Toileting 4   Upper Body Dressing 4   Grooming 4   Eating 4   Daily Activity Raw Score 24   Daily Activity Standardized Score (Calc for Raw Score >=11) 57 54   AM-[facility] Applied Cognition Inpatient   Following a Speech/Presentation 4   Understanding Ordinary Conversation 4   Taking Medications 4   Remembering Where Things Are Placed or Put Away 4   Remembering List of 4-5 Errands 4   Taking Care of Complicated Tasks 4   Applied Cognition Raw Score 24   Applied Cognition Standardized Score 62 21   Additional Treatment Session   Start Time 1520   End Time 1528   Treatment Assessment Pt engaged in UB/LB dressing session  Pt performed multiple sit <> stands at Mod I level  Pt educated on compensatory strategies & safe techniques for LB dressing  Pt completed LB dressing (underwear, pants, socks, & shoes) independently while seated in recliner   Pt completed UB dressing independently  Pt left seated in recliner, all needs met, ice applied, RN informed  End of Consult   Education Provided Yes   Patient Position at End of Consult Bedside chair; All needs within reach   Nurse Communication Nurse aware of consult     Ismael oSto OTR/L

## 2023-05-31 NOTE — INTERVAL H&P NOTE
H&P reviewed  After examining the patient I find no changes in the patients condition since the H&P had been written  Plan for right JEEVAN

## 2023-05-31 NOTE — ANESTHESIA PREPROCEDURE EVALUATION
"Procedure:  ARTHROPLASTY HIP TOTAL ANTERIOR,NAVIGATED- SAME DAY (Right: Hip)    Relevant Problems   ANESTHESIA (within normal limits)      CARDIO (within normal limits)      ENDO (within normal limits)      GI/HEPATIC (within normal limits)      /RENAL (within normal limits)      HEMATOLOGY (within normal limits)      MUSCULOSKELETAL   (+) Primary osteoarthritis of right hip      NEURO/PSYCH (within normal limits)      PULMONARY (within normal limits)      Other   (+) Arthralgia of multiple joints        Physical Exam    Airway    Mallampati score: II  TM Distance: >3 FB  Neck ROM: full     Dental   No notable dental hx     Cardiovascular  Rhythm: regular, Rate: normal, Cardiovascular exam normal    Pulmonary  Pulmonary exam normal Breath sounds clear to auscultation,     Other Findings        Anesthesia Plan  ASA Score- 2     Anesthesia Type- spinal with ASA Monitors  Additional Monitors:   Airway Plan:           Plan Factors-Exercise tolerance (METS): >4 METS  Chart reviewed  EKG reviewed  Imaging results reviewed  Existing labs reviewed  Patient summary reviewed  Obstructive sleep apnea risk education given perioperatively  Induction- intravenous  Postoperative Plan- Plan for postoperative opioid use  Informed Consent- Anesthetic plan and risks discussed with patient  I personally reviewed this patient with the CRNA  Discussed and agreed on the Anesthesia Plan with the CRNA  Aysha Mcmullen           Recent labs personally reviewed:  Lab Results   Component Value Date    HGB 14 8 04/26/2023     04/26/2023    WBC 7 2 04/26/2023     Lab Results   Component Value Date    BUN 17 04/26/2023    CREATININE 0 86 04/26/2023    K 5 1 04/26/2023     No results found for: \"PTT\"   Lab Results   Component Value Date    INR 1 0 04/26/2023       Blood type O    Lab Results   Component Value Date    HGBA1C 5 5 04/26/2023       I, Gumaro Bruno MD, have personally seen and evaluated the patient prior to " anesthetic care  I have reviewed the pre-anesthetic record, and other medical records if appropriate to the anesthetic care  If a CRNA is involved in the case, I have reviewed the CRNA assessment, if present, and agree  Risks/benefits and alternatives discussed with patient including possible PONV, sore throat, and possibility of rare anesthetic and surgical emergencies

## 2023-05-31 NOTE — DISCHARGE INSTR - AVS FIRST PAGE
Dr Myranda Seals Hip Replacement    What to Expect/Activity  You are weight bearing as tolerated to your operative leg unless otherwise instructed  Use your walker or crutches  It is important to get up and walk for 10 minutes every hour, if possible  Initial recovery therapy is focused on walking  If in your follow-up a referral to formal physical therapy is required, this will be provided based on your recovery  You may sleep however you would like  Many patients feel more comfortable with a pillow between their legs and find it uncomfortably to lay on the operative side  If you do roll on that side at night you will not damage the replacement  You may use a regular or elevated toilet seat, whichever is more comfortable  We do not routinely require any specific precautions in terms of positioning of your leg and if so this will be taught to you by the physical therapists at the hospital  This means that you can dress as you are comfortable, including shoes and socks  You can bend down carefully to get items from the floor  Swelling and discomfort causes pain  Elevate your surgical leg on 1-2 pillows placed behind your ankle/calf throughout the day, especially when you are laying down  Please use ice packs for 20-30 minutes every 1-2 hours for 48-72 hours on the operative hip  Please make sure there is a barrier directly between your skin and your ice/ice pack  Please use incentive spirometer 10 times per hour while awake (see diagram below)  Dressing/Wound Care/Bathing  There is a surgical dressing over your incision that stays in place for 7 days after surgery  You may start showering 24 hours after surgery, the surgical dressing will remain in place  Please pat the dressing dry  If you notice the dressing appears saturated or is starting to come off, please replace with a dry dressing  Keep the dressing on until your follow-up in the office  There may be dried blood around the incision   It is ok to continue showering after removing the dressing but do not scrub the incision  Pat incision dry  Do not place any creams, ointments or gels on or around the incision  No baths, swimming or submerging until cleared by Dr Meño Triplett  Pain Management/Medications  You may resume your usual medications  Please take the following medications:  Anti-coagulation (blood clot prevention) - ASA 81 mg BID for 4 weeks  Pain medication:  Narcotic Medication: Take as directed  NSAID (Anti-inflammatory): Take as directed  Tylenol 1000mg every 8 hours  Zofran (ondasetron) - 4mg every 8 hours as needed for nausea  Stool Softeners (senna/colace)- take daily to help prevent constipation as narcotic pain medication causes constipation  Antibiotic - take as directed if prescribed  If you have questions or pain concerns, please contact the office  Pain medication cannot remove all post-operative pain  Follow up/Call if:  The findings of your surgery will be explained to you and your family immediately after surgery  However, in the post-operative period, during recovery from anesthesia you may not fully remember or fully understand what was said  We will go over this again when you return for your post-op appointment    Please contact Dr Bri Gary office if you experience the following:  Excessive bleeding (bleeding through your dressing)  Fever greater than 101 degrees F after the first 2 days (a slight fever is normal the first few days after surgery)  Persistent nausea or vomiting  Decreased sensation or discoloration of the operative limb  Pain or swelling that is getting worse and not better with medication    Dr Brad Meneses: [phone]

## 2023-05-31 NOTE — ANESTHESIA POSTPROCEDURE EVALUATION
Post-Op Assessment Note    CV Status:  Stable  Pain Score: 0    Pain management: adequate     Mental Status:  Alert and awake   Hydration Status:  Euvolemic   PONV Controlled:  Controlled   Airway Patency:  Patent      Post Op Vitals Reviewed: Yes      Staff: CRNA         No notable events documented      BP   100/56   Temp   97 7   Pulse  62   Resp   17   SpO2   100% RA

## 2023-05-31 NOTE — PHYSICAL THERAPY NOTE
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\"    Right hip pain     RLS (restless legs syndrome)     Wears glasses      Length Of Stay: 0  PHYSICAL THERAPY EVALUATION :   Patient's identity confirmed via 2 patient identifiers (full name and ) at start of session       23 1501   PT Last Visit   PT Visit Date 23   Note Type   Note type Evaluation   Pain Assessment   Pain Assessment Tool 0-10   Pain Score 3   Pain Location/Orientation Orientation: Right;Location: Hip   Pain Onset/Description Onset: Ongoing   Hospital Pain Intervention(s) Medication (See MAR); Cold applied;Repositioned; Ambulation/increased activity; Emotional support   Restrictions/Precautions   Weight Bearing Precautions Per Order Yes   RLE Weight Bearing Per Order WBAT   Other Precautions Fall Risk;Pain;THR   Home Living   Type of [facility] One level  (1 LEVON)   [address] [place] [address],[address]   Additional Comments Pt [place] farm w/ his wife, no AD PTA  Prior Function   Level of [place] Independent with ADLs; Independent with functional mobility   Lives With Spouse   Receives Help From [facility] in the last 6 months 0   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)   General   Additional Pertinent History Pt seen POD0 s/p R anterior JEEVAN   Family/Caregiver Present No   Cognition   Overall Cognitive Status WFL   Arousal/Participation Alert   Orientation Level Oriented X4   Memory Within functional limits   Following Commands Follows all commands and directions without difficulty   Comments Pt is very pleasant and cooperative to participate " in PT evaluation   RLE Assessment   RLE Assessment WFL   Strength RLE   RLE Overall Strength 4/5   LLE Assessment   LLE Assessment WFL   Strength LLE   LLE Overall Strength 4/5   Bed Mobility   Supine to Sit 5  Supervision   Additional items Assist x 1; Increased time required;LE management;Verbal cues  (to pt's L)   Transfers   Sit to Stand 5  Supervision   Additional items Assist x 1   Stand to Sit 5  Supervision   Additional items Assist x 1   Ambulation/Elevation   Gait pattern Decreased foot clearance; Step through pattern   Gait Assistance 5  Supervision   Additional items Assist x 1   Assistive Device Rolling walker   Distance 200 ft   Stair Management Assistance 5  Supervision   Additional items Assist x 1;Verbal cues   Stair Management Technique No rails; With walker   Number of Stairs 2   Balance   Static Sitting Good   Static Standing Fair   Ambulatory Fair   Activity Tolerance   Activity Tolerance Patient tolerated treatment well   Medical Staff Eugene Riley, notified Dr Shannan Valdes via TT   Nurse Made Aware Spoke to PACU BRANDY Guerrero   Assessment   Problem List Decreased strength;Decreased endurance; Impaired balance;Decreased mobility   Assessment Skylar North is a 65 y o  Male who presents to [address] [place] [address] on 5/31/2023 from home for scheduled R anterior RHA  Orders for PT eval and treat received, w/ activity orders of WBAT R LE and anterior hip precautions  Pt presents w/ comorbidities of chronic fatigue, osteoarthritis of R hip  At baseline, pt mobilizes I w/ no AD, and reports 0 falls in the last 6 months  Upon evaluation, pt presents w/ the following deficits: weakness, impaired balance and pain limiting functional mobility  Upon eval, pt requires S for bed mobility, S for transfers, and S for gait  Based on this PT evaluation today, patient's discharge recommendation is for Level III   During this admission, pt would benefit from continued skilled inpatient PT in the acute care setting in order to address the abovementioned deficits to maximize function and mobility before DC from acute care  Goals   Patient Goals to have less pain and stay active   STG Expiration Date 06/03/23   Short Term Goal #1 Patient will: Perform all bed mobility tasks modified independent to improve pt's independence w/ repositioning for decrease risk of skin breakdown, Perform all transfers modified independent consistently from various height surfaces in order to improve I w/ engagement w/ real-world environments/situations, Ambulate at least 250 ft  with roller walker modified independent w/o LOB to facilitate return and engagement w/ previous living environment and Navigate 2 stairs modified independent without handrail to either improve independence w/ entering home and/or so patient can fully access living areas in home   PT Treatment Day 0   Plan   Treatment/Interventions Functional transfer training;LE strengthening/ROM; Elevations; Therapeutic exercise; Endurance training;Patient/family training;Equipment eval/education; Bed mobility;Gait training   PT Frequency Twice a day   Recommendation   UB Rehab Discharge Recommendation (PT/OT) Level 3   AM-PAC Basic Mobility Inpatient   Turning in Flat Bed Without Bedrails 3   Lying on Back to Sitting on Edge of Flat Bed Without Bedrails 3   Moving Bed to Chair 3   Standing Up From Chair Using Arms 3   Walk in Room 3   Climb 3-5 Stairs With Railing 3   Basic Mobility Inpatient Raw Score 18   Basic Mobility Standardized Score 41 05   Highest Level Of Mobility   JH-HLM Goal 6: Walk 10 steps or more   JH-HLM Achieved 7: Walk 25 feet or more   Additional Treatment Session   Start Time 1517   End Time 1525   Treatment Assessment Pt reports ongoing 3/10 R LE pain  Pt is able to ambulate another 50 ft w/ distant S without LOB  Pt does benefits from cues and edcation on proper RW height, also adjusted wheels as they were inside the RW instead of outside   Pt educated on focus on ambulating/functional mobility at home prior to initiating OPPT  Pt verbalized understanding, was S to sit in recliner chair, pt w/ OT for ADLs upon exit  Equipment Use RW   Additional Treatment Day 1   End of Consult   Patient Position at End of Consult Bedside chair; All needs within reach       The patient's AM-PAC Basic Mobility Inpatient Short Form Raw Score is 18, Standardized Score is 41 05  A standardized score greater than 38 32 (raw score of 16) suggests the patient may benefit from discharge to home which may not coincide with above PT recommendations  However please refer to therapist recommendation for discharge planning given other factors that may influence destination      Pt would benefit from skilled inpatient PT during this admission in order to facilitate progress towards goals to maximize functional independence    Deloise Denver, PT, DPT

## 2023-05-31 NOTE — OP NOTE
OPERATIVE REPORT  PATIENT NAME: Anthony Flores  : 1960  MRN: [id number]  Pt Location:  UB OR ROOM 02    Surgery Date: 2023    Surgeon(s) and Role:     * Jace Arias DO - Primary     * Marylee Copier, PA-C - Assisting     Preop Diagnosis:  Primary osteoarthritis of one hip, right [M16 11]    Post-Op Diagnosis Codes:     * Primary osteoarthritis of one hip, right [M16 11]    Procedure(s):  Right - ARTHROPLASTY HIP TOTAL ANTERIOR  NAVIGATED- SAME DAY    Specimens:  * No specimens in log *    Estimated Blood Loss:   150 mL    Drains:  * No LDAs found *    Anesthesia Type:   Spinal     Operative Indications:  Primary osteoarthritis of one hip, right [M16 11]  62M with severe bone on bone osteoarthritis of the right hip  Patient has not had success with conservative treatments in the form of physical therapy, anti-inflammatories, activity modifications and regular exercise  His pain is severely affecting her ADLs  The patient has elected to proceed with right JEEVAN through the anterior approach  Risks and benefits of surgery to include but not limited to bleeding, infection, damage to surrounding structures, hardware failure, instability, fracture, dislocation, leg length inequality, need for further surgery, continued pain, stiffness, blood clots, stroke, heart attack, and LFCN numbness was discussed with the patient  Operative Findings:  Severe osteoarthritis   Native femoral head 52 mm    Implant Name Type Inv   Item Serial No   Lot No  LRB No  Used Action   SHELL ACETABULAR 52MM POROUS SOLID LCK RING PINNACLE - [id number]  SHELL ACETABULAR 52MM POROUS SOLID LCK RING PINNACLE  [id number]  Right 1 Implanted   SCREW HEAVENLY 6 5 X 25MM SLF TAP PINNACLE - [id number]  SCREW HEAVENLY 6 5 X 25MM SLF TAP PINNACLE  DEPUY [id number] Right 1 Implanted   LINER ACTB ULT LNK PE 36 X 52MM 0DEG NEUTRAL ALTRX - [id number]  LINER ACTB ULT LNK PE 36 X 52MM 0DEG NEUTRAL ALTRX  DEPUY [id number] Right 1 Implanted   STEM FEM SZ 6 TAPER CMNTLS STD COLLAR ACTIS - [id number]  STEM FEM SZ 6 TAPER CMNTLS STD COLLAR [facility] [id number] Right 1 Implanted   HEAD FEMORAL 12/14 TPR 36MM +1 5MM ARTICULEZE BIOLOX DELTA - [id number]  HEAD FEMORAL 12/14 TPR 36MM +1 5MM ARTICULEZE BIOLOX DELTA  DEPUY [id number] Right 1 Implanted       Complications:   None    Hip Approach: Direct anterior    Procedure and Technique:  The patient seen in the preoperative area  The informed consent was confirmed  The operative site was confirmed and marked  Patient was taken to the operating room and general anesthesia was performed by the anesthesiologist   The patient's bilateral lower extremities were well padded and placed in the in the hana table boots  Patient was secured to the operating room table and all bony prominences were well padded  Patient was given perioperative antibiotics per scip protocol  A formal time-out was performed identifying the patient and correct surgical site  The right lower extremity was prepped and draped in usual sterile fashion  A 10 cm incision was made anteriorly approximately 2 fingerbreadths lateral and 3 fingerbreadths distal to ASIS avoiding the hip flexion crease sharply with a 10 blade  Electrocautery was used to cauterize skin bleeders  This was carried down through subcutaneous tissues to the level of the tensor fascia cindy fascia  Fascia was incised in line with the muscle fibers  The tensor fascia cindy muscle was then moved laterally and the inferior fascia was incised  The branches of the lateral femoral cutaneous vessles were coagulated with electrocautery  Retractors were placed carefully around the femoral neck  Pericapsular fat was removed  The anterior capsule was removed in its entirety  Retractors were then carefully placed around the femoral neck  Femoral neck was resected in line with our template  A napkin ring was performed to better facilitate removal of the head    Femoral head was removed with arpit  Femoral head measured approximately 52 mm but was significantly deformed  At this time retractors were carefully placed around the acetabulum  The labrum was removed sharply  The inferior capsule was released  Pulvinar was was removed to expose the medial wall of the acetabulum  We started sequentially reaming at a size 49 medialized the medial wall  We then sequentially reamed up to a size 51 with excellent chatter the Reamer  We then impacted the [place] sector cluster hole 52 mm cup into place with the guidance of the iDoneThis hip navigation  Our cup was placed at approximately 40° of abduction and 20 anteversion per navigation guidance  We drilled, measured and place a screw to augment fixation up the posterior column  Cup was well fixed and we placed the neutral [place] liner  The liner was inspected and assured to be well seated  At this time all the retractors were removed and attention was drawn to the femur  Retractors were placed over the greater trochanter and along the medial calcar exposing the top of the femur  Medial capsule was released off the calcar and lateral capsule was resected allowing elevation of the femur into the wound  We used the canal finer to localize the canal   We then started broaching sequentially up to a size 6 Actis  The size 6 had excellent rotational stability and canal fill  We calcar planed down to the level of stem  We trialed the hip with std offset a 1 5 trial head  The hip had excellent stability to 30° of internal rotation and 90° of external rotation with restoration of leg length and offset with the std offset and +1 5 head  This was confirmed with intraoperative navigation via the iDoneThis hip navigation platform  The trial femoral stem was removed and the real size 6 std offset stem was impacted into place  The 36 +1 5 mm ceramic head was impacted onto a dry trunnion  At this time the hip was lavaged with Irrisept solution    Hip was then irrigated with remainder of 3 L of normal saline solution  The deep tissues were again inspected for any bleeding from the lateral femoral cutaneous vessels or muscle bleeders  Any bleeders were cauterized with electrocautery  The fascia was then closed with interrupted 0 Vicryl followed by running #1 Stratafix suture  The subcutaneous tissue was closed with interrupted 2-0 Vicryl  The skin was then closed with running 4-0 Stratafix  A Prineo dressing was placed sterilely  The patient was awoken from anesthesia and taken to recovery room in stable condition  I was present for the entire case incision to closure  Tonia Wesley PA-C was necessary for positioning, retracting, and suturing  No qualified resident was available for the case       Patient Disposition:  PACU       62M s/p R JEEVAN 5/31  - multi-modal pain control   - rudy-op abx x 24hrs, duricef x 5 days    - DVT ppx: asprin 81mg BID x 4 weeks  - PT/OT  - WBAT  - No formal hip precautions, no extremes of motion  - keep dressing in place until follow-up  - f/u 10-14 days       SIGNATURE: Diaz Mujica,   DATE: May 31, 2023  TIME: 11:42 AM

## 2023-05-31 NOTE — ANESTHESIA PROCEDURE NOTES
Spinal Block    Patient location during procedure: OR  Start time: 5/31/2023 9:56 AM  Reason for block: procedure for pain and at surgeon's request  Staffing  Performed by: Mercy Gupta CRNA  Authorized by: Chaparro Borden MD    Preanesthetic Checklist  Completed: patient identified, IV checked, site marked, risks and benefits discussed, surgical consent, monitors and equipment checked, pre-op evaluation and timeout performed  Spinal Block  Patient position: sitting  Prep: ChloraPrep  Patient monitoring: cardiac monitor and frequent blood pressure checks  Approach: midline  Location: L3-4  Injection technique: single-shot  Needle  Needle type: pencil-tip   Needle gauge: 25 G  Needle length: 10 cm  Assessment  Sensory level: T4  Events: cerebrospinal fluid  Injection Assessment:  negative aspiration for heme, no paresthesia on injection and positive aspiration for clear CSF    Post-procedure:  adhesive bandage applied, pressure dressing applied, secured with tape, site cleaned and sterile dressing applied

## 2023-06-02 ENCOUNTER — TELEPHONE (OUTPATIENT)
Dept: OBGYN CLINIC | Facility: HOSPITAL | Age: 63
End: 2023-06-02

## 2023-06-09 ENCOUNTER — APPOINTMENT (OUTPATIENT)
Dept: RADIOLOGY | Facility: CLINIC | Age: 63
End: 2023-06-09
Payer: COMMERCIAL

## 2023-06-09 ENCOUNTER — OFFICE VISIT (OUTPATIENT)
Dept: OBGYN CLINIC | Facility: CLINIC | Age: 63
End: 2023-06-09

## 2023-06-09 VITALS
SYSTOLIC BLOOD PRESSURE: 120 MMHG | DIASTOLIC BLOOD PRESSURE: 72 MMHG | WEIGHT: 173 LBS | BODY MASS INDEX: 27.8 KG/M2 | HEIGHT: 66 IN

## 2023-06-09 DIAGNOSIS — Z47.1 AFTERCARE FOLLOWING RIGHT HIP JOINT REPLACEMENT SURGERY: ICD-10-CM

## 2023-06-09 DIAGNOSIS — Z47.1 AFTERCARE FOLLOWING RIGHT HIP JOINT REPLACEMENT SURGERY: Primary | ICD-10-CM

## 2023-06-09 DIAGNOSIS — Z96.641 AFTERCARE FOLLOWING RIGHT HIP JOINT REPLACEMENT SURGERY: Primary | ICD-10-CM

## 2023-06-09 DIAGNOSIS — Z96.641 AFTERCARE FOLLOWING RIGHT HIP JOINT REPLACEMENT SURGERY: ICD-10-CM

## 2023-06-09 PROCEDURE — 73502 X-RAY EXAM HIP UNI 2-3 VIEWS: CPT

## 2023-06-09 PROCEDURE — 99024 POSTOP FOLLOW-UP VISIT: CPT | Performed by: STUDENT IN AN ORGANIZED HEALTH CARE EDUCATION/TRAINING PROGRAM

## 2023-06-09 NOTE — PROGRESS NOTES
Subjective: Patient seen and examined  Pain controlled  He is doing very well overall  Progressing well, he is ambulating today with a cane  Incision without drainage, prineo dressing intact  Denies fevers or chills    Physical Exam:  Incision: CDI, no drainage or erythema noted, prineo dressing intact  ROM: normal post operatively without pain  5/5 IP/Q/HS/TA/GS, 2+ DP/PT, SILT DP/SP/S/S/TN    XR right hip: s/p press-fit total hip arthroplasty, components in good position  No fracture or dislocation  Assessment/Plan:  64 y/o male doing well 9 days s/p Right JEEVAN from 5/31/23     - continue multi-modal pain control   - Weight bearing status: WBAT RLE  - DVT ppx: aspirin 81mg twice daily for 4 weeks   - Incision care: May shower, do not scrub or submerge incision    May trim prineo as it peels off  - PT/OT  - F/U  4 weeks    Scribe Attestation    I,:  Ruthie Gooden PA-C am acting as a scribe while in the presence of the attending physician :       I,:  Anastacia Gudino DO personally performed the services described in this documentation    as scribed in my presence :

## 2023-06-30 ENCOUNTER — OFFICE VISIT (OUTPATIENT)
Dept: OBGYN CLINIC | Facility: CLINIC | Age: 63
End: 2023-06-30

## 2023-06-30 VITALS
HEART RATE: 65 BPM | SYSTOLIC BLOOD PRESSURE: 128 MMHG | HEIGHT: 66 IN | BODY MASS INDEX: 27.8 KG/M2 | WEIGHT: 173 LBS | DIASTOLIC BLOOD PRESSURE: 79 MMHG

## 2023-06-30 DIAGNOSIS — Z96.641 AFTERCARE FOLLOWING RIGHT HIP JOINT REPLACEMENT SURGERY: Primary | ICD-10-CM

## 2023-06-30 DIAGNOSIS — Z47.1 AFTERCARE FOLLOWING RIGHT HIP JOINT REPLACEMENT SURGERY: Primary | ICD-10-CM

## 2023-06-30 PROCEDURE — 99024 POSTOP FOLLOW-UP VISIT: CPT | Performed by: STUDENT IN AN ORGANIZED HEALTH CARE EDUCATION/TRAINING PROGRAM

## 2023-06-30 NOTE — PROGRESS NOTES
Subjective: Patient seen and examined  Pain controlled, he does get some pain over the IT band  He is doing very well overall  Progressing well, he is ambulating today unassissted  Incision without drainage, he has been using vitamin E over the scar  Denies fevers or chills    Physical Exam:  Incision: CDI, no drainage or erythema noted  ROM: normal post operatively without pain  5/5 IP/Q/HS/TA/GS, 2+ DP/PT, SILT DP/SP/S/S/TN    XR right hip: no new imaging today    Assessment/Plan:  66 y/o male doing well 4 5 weeks s/p Right JEEVAN from 5/31/23     - continue multi-modal pain control   - Weight bearing status: WBAT RLE  - DVT ppx: complete  - Incision care:  May shower, submerge, soak, and perform transverse friction massage   - PT/OT  - F/U 6 weeks      Scribe Attestation    I,:  Darshan Romo PA-C am acting as a scribe while in the presence of the attending physician :       I,:  Austin Fountain DO personally performed the services described in this documentation    as scribed in my presence :

## 2023-08-11 ENCOUNTER — OFFICE VISIT (OUTPATIENT)
Dept: OBGYN CLINIC | Facility: CLINIC | Age: 63
End: 2023-08-11

## 2023-08-11 ENCOUNTER — PREP FOR PROCEDURE (OUTPATIENT)
Dept: OBGYN CLINIC | Facility: CLINIC | Age: 63
End: 2023-08-11

## 2023-08-11 VITALS
HEIGHT: 66 IN | SYSTOLIC BLOOD PRESSURE: 144 MMHG | BODY MASS INDEX: 27.8 KG/M2 | WEIGHT: 173 LBS | HEART RATE: 60 BPM | DIASTOLIC BLOOD PRESSURE: 77 MMHG

## 2023-08-11 DIAGNOSIS — M16.12 PRIMARY OSTEOARTHRITIS OF ONE HIP, LEFT: Primary | ICD-10-CM

## 2023-08-11 PROCEDURE — 99024 POSTOP FOLLOW-UP VISIT: CPT | Performed by: STUDENT IN AN ORGANIZED HEALTH CARE EDUCATION/TRAINING PROGRAM

## 2023-08-11 RX ORDER — MELATONIN
2000 DAILY
Qty: 60 TABLET | Refills: 1 | Status: SHIPPED | OUTPATIENT
Start: 2023-08-11

## 2023-08-11 RX ORDER — FOLIC ACID 1 MG/1
1 TABLET ORAL DAILY
Qty: 30 TABLET | Refills: 3 | Status: SHIPPED | OUTPATIENT
Start: 2023-08-11

## 2023-08-11 RX ORDER — MULTIVIT-MIN/IRON FUM/FOLIC AC 7.5 MG-4
1 TABLET ORAL DAILY
Qty: 30 TABLET | Refills: 3 | Status: SHIPPED | OUTPATIENT
Start: 2023-08-11

## 2023-08-11 RX ORDER — ASCORBIC ACID 500 MG
500 TABLET ORAL 2 TIMES DAILY
Qty: 30 TABLET | Refills: 3 | Status: SHIPPED | OUTPATIENT
Start: 2023-08-11

## 2023-08-11 RX ORDER — CHLORHEXIDINE GLUCONATE 0.12 MG/ML
15 RINSE ORAL ONCE
OUTPATIENT
Start: 2023-08-11 | End: 2023-08-11

## 2023-08-11 RX ORDER — ACETAMINOPHEN 325 MG/1
975 TABLET ORAL ONCE
OUTPATIENT
Start: 2023-08-11 | End: 2023-08-11

## 2023-08-11 RX ORDER — SODIUM CHLORIDE, SODIUM LACTATE, POTASSIUM CHLORIDE, CALCIUM CHLORIDE 600; 310; 30; 20 MG/100ML; MG/100ML; MG/100ML; MG/100ML
125 INJECTION, SOLUTION INTRAVENOUS CONTINUOUS
OUTPATIENT
Start: 2023-08-11

## 2023-08-11 RX ORDER — CHLORHEXIDINE GLUCONATE 4 G/100ML
SOLUTION TOPICAL DAILY PRN
OUTPATIENT
Start: 2023-08-11

## 2023-08-11 NOTE — PROGRESS NOTES
Subjective: Patient seen and examined. Pain controlled, he is doing very well overall. Progressing well, he is ambulating today unassissted. Incision well healed. He is now starting to notice the pain in his left hip. He has known severe OA of the left hip which he has been treating conservatively. He would like to discuss potential Left JEEVAN today. Physical Exam:  Incision: CDI, no drainage or erythema noted. ROM: normal post operatively without pain  5/5 IP/Q/HS/TA/GS, 2+ DP/PT, SILT DP/SP/S/S/TN    XR right hip: no new imaging today  My interpretation XR AP pelvis/ left hip: severe joint space narrowing, subchondral sclerosis, subchondral cysts, osteophyte formation. No fracture or dislocation. Assessment/Plan:  64 y/o male doing well 10 weeks s/p Right JEEVAN from 5/31/23, severe left hip OA. - continue multi-modal pain control   - Weight bearing status: WBAT RLE  - DVT ppx: complete  - Incision care: Well healed surgical incision, no restrictions  - PT/OT  - Patient has tried and failed conservative treatment options for the left hip including NSAIDs, tylenol, HEP, and activity modifications. - The patient has elected to proceed with left JEEVAN through the anterior approach. Risks and benefits of surgery to include but not limited to bleeding, infection, damage to surrounding structures, hardware failure, instability, fracture, dislocation, leg length inequality, need for further surgery, continued pain, stiffness, blood clots, stroke, heart attack, and LFCN numbness was discussed with the patient. Informed consent was signed today in the office. The patient has met with our surgical schedulers and our preoperative joint replacement pathway has been initiated. All questions were answered. Patient will follow-up 2 weeks post operatively. Patient is a nonsmoker and appropriate BMI of 27.92.         Scribe Attestation    I,:  Nathalie Samayoa PA-C am acting as a scribe while in the presence of the attending physician.:       I,:  Jr Mazariegos DO personally performed the services described in this documentation    as scribed in my presence.:

## 2023-08-14 ENCOUNTER — TELEPHONE (OUTPATIENT)
Dept: FAMILY MEDICINE CLINIC | Facility: CLINIC | Age: 63
End: 2023-08-14

## 2023-08-14 NOTE — TELEPHONE ENCOUNTER
VM transcription from Provider Line 8/11/23:    Hi, this is Taina Postin calling from [employer]. We have a mutual patient that needs a medical clearance for a procedure. His surgery is not until January 10th, so he does need a clearance within that 30 days, so sometime after December 12th. His name is Rossy Hawk. Deepak Waller Date of birth [birth year]. If you could kindly call him and arrange a medical clearance. His phone number is [phone]. My name is Taina Postin from [employer]. My number is [phone] if there's any questions. Thanks so much. Have a good day. Thank you.

## 2023-12-06 ENCOUNTER — TELEPHONE (OUTPATIENT)
Age: 63
End: 2023-12-06

## 2023-12-06 ENCOUNTER — TELEPHONE (OUTPATIENT)
Dept: OBGYN CLINIC | Facility: HOSPITAL | Age: 63
End: 2023-12-06

## 2023-12-06 NOTE — TELEPHONE ENCOUNTER
Caller: Patient     Doctor: Francis Lobato     Reason for call: Missed call     Call back#: [phone]

## 2023-12-07 ENCOUNTER — APPOINTMENT (OUTPATIENT)
Dept: RADIOLOGY | Facility: CLINIC | Age: 63
End: 2023-12-07
Payer: COMMERCIAL

## 2023-12-07 ENCOUNTER — OFFICE VISIT (OUTPATIENT)
Dept: URGENT CARE | Facility: CLINIC | Age: 63
End: 2023-12-07
Payer: COMMERCIAL

## 2023-12-07 VITALS
SYSTOLIC BLOOD PRESSURE: 136 MMHG | TEMPERATURE: 98.8 F | RESPIRATION RATE: 16 BRPM | DIASTOLIC BLOOD PRESSURE: 81 MMHG | OXYGEN SATURATION: 98 % | HEART RATE: 70 BPM
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
## 2023-12-07 DIAGNOSIS — M75.02 ADHESIVE CAPSULITIS OF LEFT SHOULDER: Primary | ICD-10-CM

## 2023-12-07 PROCEDURE — 99213 OFFICE O/P EST LOW 20 MIN: CPT

## 2023-12-07 PROCEDURE — 73030 X-RAY EXAM OF SHOULDER: CPT

## 2023-12-07 PROCEDURE — 71101 X-RAY EXAM UNILAT RIBS/CHEST: CPT

## 2023-12-07 RX ORDER — METHYLPREDNISOLONE 4 MG/1
TABLET ORAL
Qty: 21 TABLET | Refills: 0 | Status: SHIPPED | OUTPATIENT
Start: 2023-12-07

## 2023-12-07 NOTE — PROGRESS NOTES
[facility]        NAME: Michael Webber is a 59 y.o. male  : 1960    MRN: [id number]  DATE: 2023  TIME: 5:03 PM    Assessment and Plan   Adhesive capsulitis of left shoulder [M75.02]  1. Adhesive capsulitis of left shoulder  Ambulatory Referral to Orthopedic Surgery    methylPREDNISolone 4 MG tablet therapy pack      2. Fall, initial encounter  XR shoulder 2+ vw left    XR ribs left w pa chest min 3 views    Ambulatory Referral to Orthopedic Surgery    methylPREDNISolone 4 MG tablet therapy pack    CANCELED: XR ribs 2 vw left            Patient Instructions     The patient was instructed to continue supportive measures like rest and apply ice to pain sites, as needed. The patient was instructed to complete range of motion activities for shoulder strength and movement. The patient was instructed to pick-up prescriptions from pharmacy and take, as prescribed. The patient was instructed to take OTC NSAIDs for pain and inflammation. The patient was instructed that the official read from the radiologist will be available in [address] [place] [address]. The patient was instructed to follow-up with orthopedics. The patient was instructed to follow-up with PCP, as needed. Chief Complaint     Chief Complaint   Patient presents with    Fall     Pt reports he fell off a hay bale on Saturday, landed on left side, and is now experiencing pain in his left ribcage and shoulder. Denies HS. History of Present Illness       Michael Webber is a 59year-old male who presents to the Urgent Care setting with complaints of left shoulder pain and left sided rib cage pain. 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 The patient explains that he was standing on hay dana, where he was about 10-12 feet off of the ground. The patient explains that he took a step where he fell off to the ground.  The patient explains that he fell 15 feet to the ground where he landed on his left side. The patient explains that he did not strike his head or lose consciousness. The patient explains that he attempted to rest the arm, but has not seen much improvement in his symptoms. The patient does not endorse taking any OTC medications for the pain. The patient denies dizziness or lightheadedness. The patient explains he has some ROM in the left shoulder. The patient presents today for further evaluation. In the clinic, the patient had a series of XR completed. Fall  Pertinent negatives include no abdominal pain, headaches or numbness. Review of Systems   Review of Systems   Constitutional:  Negative for activity change. HENT:  Negative for postnasal drip and rhinorrhea. Eyes:  Negative for photophobia. Respiratory:  Negative for chest tightness and shortness of breath. Cardiovascular:  Negative for chest pain. Gastrointestinal:  Negative for abdominal distention and abdominal pain. Musculoskeletal:  Positive for arthralgias, back pain (The patient complains of L lateral rib cage pain.), joint swelling and myalgias. Negative for gait problem and neck pain. Skin:  Negative for color change, pallor and wound. Neurological:  Negative for dizziness, speech difficulty, weakness, light-headedness, numbness and headaches.          Current Medications       Current Outpatient Medications:     methylPREDNISolone 4 MG tablet therapy pack, Use as directed on package, Disp: 21 tablet, Rfl: 0    acetaminophen (TYLENOL) 500 mg tablet, Take 2 tablets (1,000 mg total) by mouth every 8 (eight) hours, Disp: 60 tablet, Rfl: 1    ascorbic acid (VITAMIN C) 500 MG tablet, Take 1 tablet (500 mg total) by mouth 2 (two) times a day, Disp: 60 tablet, Rfl: 1    ascorbic acid (VITAMIN C) 500 MG tablet, Take 1 tablet (500 mg total) by mouth 2 (two) times a day, Disp: 30 tablet, Rfl: 3    aspirin 81 mg chewable tablet, Chew 1 tablet (81 mg total) 2 (two) times a day, Disp: 60 tablet, Rfl: 0    celecoxib (CeleBREX) 100 mg capsule, Take 1 capsule (100 mg total) by mouth 2 (two) times a day, Disp: 60 capsule, Rfl: 0    cholecalciferol (VITAMIN D3) 1,000 units tablet, Take 2 tablets (2,000 Units total) by mouth daily, Disp: 60 tablet, Rfl: 1    cholecalciferol (VITAMIN D3) 1,000 units tablet, Take 2 tablets (2,000 Units total) by mouth daily, Disp: 60 tablet, Rfl: 1    folic acid (FOLVITE) 1 mg tablet, Take 1 tablet (1 mg total) by mouth daily, Disp: 30 tablet, Rfl: 1    folic acid (FOLVITE) 1 mg tablet, Take 1 tablet (1 mg total) by mouth daily, Disp: 30 tablet, Rfl: 3    MAGNESIUM PO, Take by mouth, Disp: , Rfl:     Multiple Vitamin (multivitamin) tablet, Take 1 tablet by mouth daily, Disp: , Rfl:     Multiple Vitamins-Minerals (multivitamin with minerals) tablet, Take 1 tablet by mouth daily, Disp: 30 tablet, Rfl: 1    Multiple Vitamins-Minerals (multivitamin with minerals) tablet, Take 1 tablet by mouth daily, Disp: 30 tablet, Rfl: 3    NON FORMULARY, Herbal Curcumin as needed for pain, Disp: , Rfl:     ondansetron (ZOFRAN) 4 mg tablet, Take 1 tablet (4 mg total) by mouth every 8 (eight) hours as needed for nausea or vomiting, Disp: 20 tablet, Rfl: 0    Probiotic Product (PRO-BIOTIC BLEND PO), Take by mouth, Disp: , Rfl:     senna-docusate sodium (SENOKOT-S) 8.6-50 mg per tablet, Take 1 tablet by mouth daily, Disp: 14 tablet, Rfl: 0    Current Allergies     Allergies as of 12/07/2023    (No Known Allergies)            The following portions of the patient's history were reviewed and updated as appropriate: allergies, current medications, past family history, past medical history, past social history, past surgical history and problem list.     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"    Right hip pain     RLS (restless legs syndrome)     Wears glasses        Past Surgical History:   Procedure Laterality Date    COLONOSCOPY      HERNIA REPAIR  2000    [place] ARTHRP ACETBLR/PROX FEM PROSTC AGRFT/ALGRFT Right 5/31/2023    Procedure: ARTHROPLASTY HIP TOTAL ANTERIOR,NAVIGATED- SAME DAY; Surgeon: Diya Bustamante DO;  Location:  MAIN OR;  Service: Orthopedics    TONSILLECTOMY      VASECTOMY         Family History   Problem Relation Age of Onset    Asthma Mother     Osteoporosis Mother     Diabetes Mother     Thyroid disease Mother     Seizures Daughter     Stroke Daughter     Melanoma Father     Prostate cancer Father          Medications have been verified. Objective   /81   Pulse 70   Temp 98.8 °F (37.1 °C)   Resp 16   SpO2 98%   No LMP for male patient. Physical Exam     Physical Exam  Constitutional:       General: He is awake. He is not in acute distress. Appearance: He is not ill-appearing. HENT:      Nose: No congestion or rhinorrhea. Cardiovascular:      Rate and Rhythm: Normal rate and regular rhythm. Heart sounds: Normal heart sounds, S1 normal and S2 normal. No murmur heard. Pulmonary:      Effort: No tachypnea, accessory muscle usage or respiratory distress. Breath sounds: Normal breath sounds and air entry. No decreased air movement or transmitted upper airway sounds. No decreased breath sounds, wheezing or rhonchi. Chest:      Chest wall: Swelling and tenderness present. No deformity or crepitus. Musculoskeletal:      Right shoulder: No swelling, deformity, effusion, tenderness or crepitus. Normal range of motion. Normal pulse. Left shoulder: Swelling and tenderness present. No deformity or crepitus. Decreased range of motion. Normal pulse. Right upper arm: No swelling or tenderness. Left upper arm: Swelling and tenderness present. Comments: The patient presents with swelling on the L posterior arm. The patient was unable to actively extend L arm. The patient was able to passively extend arm. Neurological:      Mental Status: He is alert.

## 2023-12-13 ENCOUNTER — OFFICE VISIT (OUTPATIENT)
Dept: FAMILY MEDICINE CLINIC | Facility: CLINIC | Age: 63
End: 2023-12-13
Payer: COMMERCIAL

## 2023-12-13 VITALS
SYSTOLIC BLOOD PRESSURE: 122 MMHG | DIASTOLIC BLOOD PRESSURE: 62 MMHG | TEMPERATURE: 97.6 F | HEIGHT: 66 IN | OXYGEN SATURATION: 98 % | HEART RATE: 70 BPM | BODY MASS INDEX: 27.8 KG/M2 | WEIGHT: 173 LBS

## 2023-12-13 DIAGNOSIS — M16.12 PRIMARY OSTEOARTHRITIS OF LEFT HIP: ICD-10-CM

## 2023-12-13 DIAGNOSIS — Z01.818 PRE-OP EXAMINATION: Primary | ICD-10-CM

## 2023-12-13 PROBLEM — R53.82 CHRONIC FATIGUE: Status: RESOLVED | Noted: 2022-11-16 | Resolved: 2023-12-13

## 2023-12-13 PROBLEM — M16.11 PRIMARY OSTEOARTHRITIS OF RIGHT HIP: Status: RESOLVED | Noted: 2023-05-03 | Resolved: 2023-12-13

## 2023-12-13 PROBLEM — R94.31 ABNORMAL EKG: Status: RESOLVED | Noted: 2023-05-03 | Resolved: 2023-12-13

## 2023-12-13 LAB
ABO GROUP BLD: NORMAL
ALBUMIN SERPL-MCNC: 4.5 G/DL (ref 3.9–4.9)
ALBUMIN/GLOB SERPL: 2 {RATIO} (ref 1.2–2.2)
ALP SERPL-CCNC: 99 IU/L (ref 44–121)
ALT SERPL-CCNC: 13 IU/L (ref 0–44)
APTT PPP: 30 SEC (ref 24–33)
AST SERPL-CCNC: 20 IU/L (ref 0–40)
BASOPHILS # BLD AUTO: 0 X10E3/UL (ref 0–0.2)
BASOPHILS NFR BLD AUTO: 1 %
BILIRUB SERPL-MCNC: 0.6 MG/DL (ref 0–1.2)
BLD GP AB SCN SERPL QL: NEGATIVE
BUN SERPL-MCNC: 15 MG/DL (ref 8–27)
BUN/CREAT SERPL: 15 (ref 10–24)
CALCIUM SERPL-MCNC: 9.3 MG/DL (ref 8.6–10.2)
CHLORIDE SERPL-SCNC: 102 MMOL/L (ref 96–106)
CO2 SERPL-SCNC: 23 MMOL/L (ref 20–29)
CREAT SERPL-MCNC: 1 MG/DL (ref 0.76–1.27)
EGFR: 85 ML/MIN/1.73
EOSINOPHIL # BLD AUTO: 0.2 X10E3/UL (ref 0–0.4)
EOSINOPHIL NFR BLD AUTO: 3 %
ERYTHROCYTE [DISTWIDTH] IN BLOOD BY AUTOMATED COUNT: 12.7 % (ref 11.6–15.4)
EST. AVERAGE GLUCOSE BLD GHB EST-MCNC: 108 MG/DL
FERRITIN SERPL-MCNC: 187 NG/ML (ref 30–400)
FOLATE SERPL-MCNC: 18.5 NG/ML
GLOBULIN SER-MCNC: 2.2 G/DL (ref 1.5–4.5)
GLUCOSE SERPL-MCNC: 100 MG/DL (ref 70–99)
HBA1C MFR BLD: 5.4 % (ref 4.8–5.6)
HCT VFR BLD AUTO: 46.3 % (ref 37.5–51)
HGB BLD-MCNC: 15.4 G/DL (ref 13–17.7)
IMM GRANULOCYTES # BLD: 0 X10E3/UL (ref 0–0.1)
IMM GRANULOCYTES NFR BLD: 0 %
INR PPP: 1 (ref 0.9–1.2)
IRON SATN MFR SERPL: 37 % (ref 15–55)
IRON SERPL-MCNC: 114 UG/DL (ref 38–169)
LYMPHOCYTES # BLD AUTO: 1.9 X10E3/UL (ref 0.7–3.1)
LYMPHOCYTES NFR BLD AUTO: 29 %
MCH RBC QN AUTO: 30.3 PG (ref 26.6–33)
MCHC RBC AUTO-ENTMCNC: 33.3 G/DL (ref 31.5–35.7)
MCV RBC AUTO: 91 FL (ref 79–97)
MONOCYTES # BLD AUTO: 0.5 X10E3/UL (ref 0.1–0.9)
MONOCYTES NFR BLD AUTO: 7 %
NEUTROPHILS # BLD AUTO: 3.9 X10E3/UL (ref 1.4–7)
NEUTROPHILS NFR BLD AUTO: 60 %
PLATELET # BLD AUTO: 328 X10E3/UL (ref 150–450)
POTASSIUM SERPL-SCNC: 4.5 MMOL/L (ref 3.5–5.2)
PROT SERPL-MCNC: 6.7 G/DL (ref 6–8.5)
PROTHROMBIN TIME: 10.7 SEC (ref 9.1–12)
RBC # BLD AUTO: 5.09 X10E6/UL (ref 4.14–5.8)
RETICS/RBC NFR AUTO: 1.2 % (ref 0.6–2.6)
RH BLD: NEGATIVE
SODIUM SERPL-SCNC: 139 MMOL/L (ref 134–144)
TIBC SERPL-MCNC: 307 UG/DL (ref 250–450)
UIBC SERPL-MCNC: 193 UG/DL (ref 111–343)
VIT B12 SERPL-MCNC: 655 PG/ML (ref 232–1245)
WBC # BLD AUTO: 6.6 X10E3/UL (ref 3.4–10.8)

## 2023-12-13 PROCEDURE — 99214 OFFICE O/P EST MOD 30 MIN: CPT | Performed by: FAMILY MEDICINE

## 2023-12-13 NOTE — H&P (VIEW-ONLY)
[facility] PRE-OPERATIVE EVALUATION  [facility] PHYSICIAN GROUP [facility]      Depression Screening and Follow-up Plan: Patient was screened for depression during today's encounter. They screened negative with a PHQ-2 score of 0.          NAME: Jens Hernandez  AGE: 63 y.o. SEX: male  : 1960     DATE: 2023    [facility] Pre-Operative Evaluation      Chief Complaint: Pre-operative Evaluation     Surgery: ARTHROPLASTY HIP TOTAL ANTERIOR,NAVIGATED-   Anticipated Date of Surgery: 1/10/2024  Referring Provider: No ref. provider found       History of Present Illness:     Jens Hernandez is a 63 y.o. male who presents to the office today for a preoperative consultation at the request of surgeon, Dr. Calderon, who plans on performing ARTHROPLASTY HIP TOTAL ANTERIOR,NAVIGATED-  on 1/10/2024. Planned anesthesia is spinal. Patient has a bleeding risk of: no recent abnormal bleeding, no remote history of abnormal bleeding, and no use of Ca-channel blockers. Patient does not have objections to receiving blood products if needed. Current anti-platelet/anti-coagulation medications that the patient is prescribed includes:  none .      Assessment of Chronic Conditions:   - none     Assessment of Cardiac Risk:  Denies unstable or severe angina or MI in the last 6 weeks or history of stent placement in the last year   Denies decompensated heart failure (e.g. New onset heart failure, NYHA functional class IV heart failure, or worsening existing heart failure)  Denies significant arrhythmias such as high grade AV block, symptomatic ventricular arrhythmia, newly recognized ventricular tachycardia, supraventricular tachycardia with resting heart rate >100, or symptomatic bradycardia  Denies severe heart valve disease including aortic stenosis or symptomatic mitral stenosis     Exercise Capacity:  Able to walk 4 blocks without symptoms?: Yes, left hip pain  Able to walk 2 flights without symptoms?:  Yes, left hip pain    Prior Anesthesia Reactions: No     Personal history of venous thromboembolic disease? No    History of steroid use for >2 weeks within last year? No         Review of Systems:     Review of Systems   Constitutional: Negative.  Negative for fatigue and fever.   HENT: Negative.     Eyes: Negative.    Respiratory: Negative.  Negative for cough.    Cardiovascular: Negative.    Gastrointestinal: Negative.    Endocrine: Negative.    Genitourinary: Negative.    Musculoskeletal:  Positive for arthralgias.   Skin: Negative.    Allergic/Immunologic: Negative.    Neurological: Negative.    Psychiatric/Behavioral: Negative.         Current Problem List:     Patient Active Problem List   Diagnosis    Arthralgia of multiple joints    Atypical nevi    Pre-op examination    Primary osteoarthritis of left hip       Allergies:     No Known Allergies    Current Medications:       Current Outpatient Medications:     ascorbic acid (VITAMIN C) 500 MG tablet, Take 1 tablet (500 mg total) by mouth 2 (two) times a day, Disp: 60 tablet, Rfl: 1    ascorbic acid (VITAMIN C) 500 MG tablet, Take 1 tablet (500 mg total) by mouth 2 (two) times a day, Disp: 30 tablet, Rfl: 3    cholecalciferol (VITAMIN D3) 1,000 units tablet, Take 2 tablets (2,000 Units total) by mouth daily, Disp: 60 tablet, Rfl: 1    cholecalciferol (VITAMIN D3) 1,000 units tablet, Take 2 tablets (2,000 Units total) by mouth daily, Disp: 60 tablet, Rfl: 1    folic acid (FOLVITE) 1 mg tablet, Take 1 tablet (1 mg total) by mouth daily, Disp: 30 tablet, Rfl: 1    folic acid (FOLVITE) 1 mg tablet, Take 1 tablet (1 mg total) by mouth daily, Disp: 30 tablet, Rfl: 3    MAGNESIUM PO, Take by mouth, Disp: , Rfl:     methylPREDNISolone 4 MG tablet therapy pack, Use as directed on package, Disp: 21 tablet, Rfl: 0    Multiple Vitamin (multivitamin) tablet, Take 1 tablet by mouth daily, Disp: , Rfl:     Multiple Vitamins-Minerals (multivitamin with minerals) tablet, Take  "1 tablet by mouth daily, Disp: 30 tablet, Rfl: 1    Multiple Vitamins-Minerals (multivitamin with minerals) tablet, Take 1 tablet by mouth daily, Disp: 30 tablet, Rfl: 3    NON FORMULARY, Herbal Curcumin as needed for pain, Disp: , Rfl:     Probiotic Product (PRO-BIOTIC BLEND PO), Take by mouth, Disp: , Rfl:     Past Medical History:       Past Medical History:   Diagnosis Date    History of COVID-19 01/2022    recovered at home    Moderate exercise     \"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\"    Right hip pain     RLS (restless legs syndrome)     Wears glasses         Past Surgical History:   Procedure Laterality Date    COLONOSCOPY      HERNIA REPAIR  2000    [place] ARTHRP ACETBLR/PROX FEM PROSTC AGRFT/ALGRFT Right 5/31/2023    Procedure: ARTHROPLASTY HIP TOTAL ANTERIOR,NAVIGATED- SAME DAY;  Surgeon: David Calderon DO;  Location:  MAIN OR;  Service: Orthopedics    TONSILLECTOMY      VASECTOMY          Family History   Problem Relation Age of Onset    Asthma Mother     Osteoporosis Mother     Diabetes Mother     Thyroid disease Mother     Seizures Daughter     Stroke Daughter     Melanoma Father     Prostate cancer Father         Social History     Socioeconomic History    Marital status: /Civil Union     Spouse name: Not on file    Number of children: Not on file    Years of education: Not on file    Highest education level: Not on file   Occupational History    Not on file   Tobacco Use    Smoking status: Never    Smokeless tobacco: Never   Vaping Use    Vaping status: Never Used   Substance and Sexual Activity    Alcohol use: Yes     Comment: limited     Drug use: Never    Sexual activity: Not on file     Comment: defer   Other Topics Concern    Not on file   Social History Narrative    Not on file     Social Determinants of Health     Financial Resource Strain: Not on file   Food Insecurity: Not on file   Transportation Needs: Not on file   Physical Activity: Not on file   Stress: Not on file   Social Connections: Not " "on file   Intimate Partner Violence: Not on file   Housing Stability: Not on file        Physical Exam:     /62   Pulse 70   Temp 97.6 °F (36.4 °C) (Tympanic)   Ht 5' 6\" (1.676 m)   Wt 78.5 kg (173 lb)   SpO2 98%   BMI 27.92 kg/m²     Physical Exam  Vitals and nursing note reviewed.   Constitutional:       Appearance: He is well-developed.   HENT:      Head: Normocephalic and atraumatic.   Cardiovascular:      Rate and Rhythm: Normal rate and regular rhythm.      Heart sounds: Normal heart sounds.   Pulmonary:      Effort: Pulmonary effort is normal.      Breath sounds: Normal breath sounds.   Abdominal:      General: Bowel sounds are normal.      Palpations: Abdomen is soft.   Skin:     General: Skin is warm and dry.   Neurological:      Mental Status: He is alert and oriented to person, place, and time.   Psychiatric:         Behavior: Behavior normal.         Thought Content: Thought content normal.         Judgment: Judgment normal.          Data:     Pre-operative work-up    Laboratory Results: I have personally reviewed the pertinent laboratory results/reports      EKG: I have personally reviewed pertinent reports.    No change from 5/12/2023. Had echo 55% EF with normal wall motion  Chest x-ray:  not indicated       Previous cardiopulmonary studies within the past year:  Echocardiogram: 5/12/2023  Cardiac Catheterization:   Stress Test:   Pulmonary Function Testing:       Assessment & Recommendations:     1. Pre-op examination  POCT ECG      2. Primary osteoarthritis of left hip            Pre-Op Evaluation Assessment  63 y.o. male with planned surgery: ARTHROPLASTY HIP TOTAL ANTERIOR,NAVIGATED- .    Known risk factors for perioperative complications: None.        Current medications which may produce withdrawal symptoms if withheld perioperatively: none.    Pre-Op Evaluation Plan  1. Further preoperative workup as follows:   - None; no further preoperative work-up is required    2. Medication " Management/Recommendations:   - None, continue medication regimen including morning of surgery, with sip of water  - Patient has been instructed to avoid herbs or non-directed vitamins the week prior to surgery to ensure no drug interactions with perioperative surgical and anesthetic medications.  - Patient has been instructed to avoid aspirin containing medications or non-steroidal anti-inflammatory drugs for the week preceding surgery.    3. Prophylaxis for cardiac events with perioperative beta-blockers: not indicated.    4. Patient requires further consultation with: None    Clearance  Patient is CLEARED for surgery without any additional cardiac testing.     DO MARIA DEL ROSARIO Jurado [address] [address]  Phone#  [phone]  Fax#  [phone]

## 2023-12-13 NOTE — PROGRESS NOTES
[facility] PRE-OPERATIVE EVALUATION  [facility]ICIAN [facility]      Depression Screening and Follow-up Plan: Patient was screened for depression during today's encounter. They screened negative with a PHQ-2 score of 0.          NAME: Jens Hernandez  AGE: 63 y.o. SEX: male  : 1960     DATE: 2023    [facility] Pre-Operative Evaluation      Chief Complaint: Pre-operative Evaluation     Surgery: ARTHROPLASTY HIP TOTAL ANTERIOR,NAVIGATED-   Anticipated Date of Surgery: 1/10/2024  Referring Provider: No ref. provider found       History of Present Illness:     Jens Hernandez is a 63 y.o. male who presents to the office today for a preoperative consultation at the request of surgeon, Dr. Calderon, who plans on performing ARTHROPLASTY HIP TOTAL ANTERIOR,NAVIGATED-  on 1/10/2024. Planned anesthesia is spinal. Patient has a bleeding risk of: no recent abnormal bleeding, no remote history of abnormal bleeding, and no use of Ca-channel blockers. Patient does not have objections to receiving blood products if needed. Current anti-platelet/anti-coagulation medications that the patient is prescribed includes:  none .      Assessment of Chronic Conditions:   - none     Assessment of Cardiac Risk:  Denies unstable or severe angina or MI in the last 6 weeks or history of stent placement in the last year   Denies decompensated heart failure (e.g. New onset heart failure, NYHA functional class IV heart failure, or worsening existing heart failure)  Denies significant arrhythmias such as high grade AV block, symptomatic ventricular arrhythmia, newly recognized ventricular tachycardia, supraventricular tachycardia with resting heart rate >100, or symptomatic bradycardia  Denies severe heart valve disease including aortic stenosis or symptomatic mitral stenosis     Exercise Capacity:  Able to walk 4 blocks without symptoms?: Yes, left hip pain  Able to walk 2 flights without symptoms?:  Yes, left hip pain    Prior Anesthesia Reactions: No     Personal history of venous thromboembolic disease? No    History of steroid use for >2 weeks within last year? No         Review of Systems:     Review of Systems   Constitutional: Negative.  Negative for fatigue and fever.   HENT: Negative.     Eyes: Negative.    Respiratory: Negative.  Negative for cough.    Cardiovascular: Negative.    Gastrointestinal: Negative.    Endocrine: Negative.    Genitourinary: Negative.    Musculoskeletal:  Positive for arthralgias.   Skin: Negative.    Allergic/Immunologic: Negative.    Neurological: Negative.    Psychiatric/Behavioral: Negative.         Current Problem List:     Patient Active Problem List   Diagnosis    Arthralgia of multiple joints    Atypical nevi    Pre-op examination    Primary osteoarthritis of left hip       Allergies:     No Known Allergies    Current Medications:       Current Outpatient Medications:     ascorbic acid (VITAMIN C) 500 MG tablet, Take 1 tablet (500 mg total) by mouth 2 (two) times a day, Disp: 60 tablet, Rfl: 1    ascorbic acid (VITAMIN C) 500 MG tablet, Take 1 tablet (500 mg total) by mouth 2 (two) times a day, Disp: 30 tablet, Rfl: 3    cholecalciferol (VITAMIN D3) 1,000 units tablet, Take 2 tablets (2,000 Units total) by mouth daily, Disp: 60 tablet, Rfl: 1    cholecalciferol (VITAMIN D3) 1,000 units tablet, Take 2 tablets (2,000 Units total) by mouth daily, Disp: 60 tablet, Rfl: 1    folic acid (FOLVITE) 1 mg tablet, Take 1 tablet (1 mg total) by mouth daily, Disp: 30 tablet, Rfl: 1    folic acid (FOLVITE) 1 mg tablet, Take 1 tablet (1 mg total) by mouth daily, Disp: 30 tablet, Rfl: 3    MAGNESIUM PO, Take by mouth, Disp: , Rfl:     methylPREDNISolone 4 MG tablet therapy pack, Use as directed on package, Disp: 21 tablet, Rfl: 0    Multiple Vitamin (multivitamin) tablet, Take 1 tablet by mouth daily, Disp: , Rfl:     Multiple Vitamins-Minerals (multivitamin with minerals) tablet, Take  "1 tablet by mouth daily, Disp: 30 tablet, Rfl: 1    Multiple Vitamins-Minerals (multivitamin with minerals) tablet, Take 1 tablet by mouth daily, Disp: 30 tablet, Rfl: 3    NON FORMULARY, Herbal Curcumin as needed for pain, Disp: , Rfl:     Probiotic Product (PRO-BIOTIC BLEND PO), Take by mouth, Disp: , Rfl:     Past Medical History:       Past Medical History:   Diagnosis Date    History of COVID-19 01/2022    recovered at home    Moderate exercise     \"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\"    Right hip pain     RLS (restless legs syndrome)     Wears glasses         Past Surgical History:   Procedure Laterality Date    COLONOSCOPY      HERNIA REPAIR  2000    OK ARTHRP ACETBLR/PROX FEM PROSTC AGRFT/ALGRFT Right 5/31/2023    Procedure: ARTHROPLASTY HIP TOTAL ANTERIOR,NAVIGATED- SAME DAY;  Surgeon: David Calderon DO;  Location:  MAIN OR;  Service: Orthopedics    TONSILLECTOMY      VASECTOMY          Family History   Problem Relation Age of Onset    Asthma Mother     Osteoporosis Mother     Diabetes Mother     Thyroid disease Mother     Seizures Daughter     Stroke Daughter     Melanoma Father     Prostate cancer Father         Social History     Socioeconomic History    Marital status: /Civil Union     Spouse name: Not on file    Number of children: Not on file    Years of education: Not on file    Highest education level: Not on file   Occupational History    Not on file   Tobacco Use    Smoking status: Never    Smokeless tobacco: Never   Vaping Use    Vaping status: Never Used   Substance and Sexual Activity    Alcohol use: Yes     Comment: limited     Drug use: Never    Sexual activity: Not on file     Comment: defer   Other Topics Concern    Not on file   Social History Narrative    Not on file     Social Determinants of Health     Financial Resource Strain: Not on file   Food Insecurity: Not on file   Transportation Needs: Not on file   Physical Activity: Not on file   Stress: Not on file   Social Connections: Not " "on file   Intimate Partner Violence: Not on file   Housing Stability: Not on file        Physical Exam:     /62   Pulse 70   Temp 97.6 °F (36.4 °C) (Tympanic)   Ht 5' 6\" (1.676 m)   Wt 78.5 kg (173 lb)   SpO2 98%   BMI 27.92 kg/m²     Physical Exam  Vitals and nursing note reviewed.   Constitutional:       Appearance: He is well-developed.   HENT:      Head: Normocephalic and atraumatic.   Cardiovascular:      Rate and Rhythm: Normal rate and regular rhythm.      Heart sounds: Normal heart sounds.   Pulmonary:      Effort: Pulmonary effort is normal.      Breath sounds: Normal breath sounds.   Abdominal:      General: Bowel sounds are normal.      Palpations: Abdomen is soft.   Skin:     General: Skin is warm and dry.   Neurological:      Mental Status: He is alert and oriented to person, place, and time.   Psychiatric:         Behavior: Behavior normal.         Thought Content: Thought content normal.         Judgment: Judgment normal.          Data:     Pre-operative work-up    Laboratory Results: I have personally reviewed the pertinent laboratory results/reports      EKG: I have personally reviewed pertinent reports.    No change from 5/12/2023. Had echo 55% EF with normal wall motion  Chest x-ray:  not indicated       Previous cardiopulmonary studies within the past year:  Echocardiogram: 5/12/2023  Cardiac Catheterization:   Stress Test:   Pulmonary Function Testing:       Assessment & Recommendations:     1. Pre-op examination  POCT ECG      2. Primary osteoarthritis of left hip            Pre-Op Evaluation Assessment  63 y.o. male with planned surgery: ARTHROPLASTY HIP TOTAL ANTERIOR,NAVIGATED- .    Known risk factors for perioperative complications: None.        Current medications which may produce withdrawal symptoms if withheld perioperatively: none.    Pre-Op Evaluation Plan  1. Further preoperative workup as follows:   - None; no further preoperative work-up is required    2. Medication " Management/Recommendations:   - None, continue medication regimen including morning of surgery, with sip of water  - Patient has been instructed to avoid herbs or non-directed vitamins the week prior to surgery to ensure no drug interactions with perioperative surgical and anesthetic medications.  - Patient has been instructed to avoid aspirin containing medications or non-steroidal anti-inflammatory drugs for the week preceding surgery.    3. Prophylaxis for cardiac events with perioperative beta-blockers: not indicated.    4. Patient requires further consultation with: None    Clearance  Patient is CLEARED for surgery without any additional cardiac testing.     DO MARIA DEL ROSARIO Jurado [address] [address]  Phone#  [phone]  Fax#  [phone]

## 2023-12-14 PROCEDURE — 93000 ELECTROCARDIOGRAM COMPLETE: CPT | Performed by: FAMILY MEDICINE

## 2023-12-18 NOTE — PRE-PROCEDURE INSTRUCTIONS
Pre-Surgery Instructions:   Medication Instructions    ascorbic acid (VITAMIN C) 500 MG tablet Hold day of surgery.    cholecalciferol (VITAMIN D3) 1,000 units tablet Hold day of surgery.    folic acid (FOLVITE) 1 mg tablet Hold day of surgery.    MAGNESIUM PO Stop taking 7 days prior to surgery.    Multiple Vitamins-Minerals (multivitamin with minerals) tablet Hold day of surgery.    NON FORMULARY Stop taking 7 days prior to surgery.    Probiotic Product (PRO-BIOTIC BLEND PO) Stop taking 7 days prior to surgery.   Medication instructions for day surgery reviewed. Please use only a sip of water to take your instructed medications. Avoid all over the counter vitamins, supplements and NSAIDS for one week prior to surgery per anesthesia guidelines. Tylenol is ok to take as needed.     You will receive a call one business day prior to surgery with an arrival time and hospital directions. If your surgery is scheduled on a Monday, the hospital will be calling you on the Friday prior to your surgery. If you have not heard from anyone by 8pm, please call the hospital supervisor through the hospital  at [phone]. (Derek [phone]).    Do not eat or drink anything after midnight the night before your surgery, including candy, mints, lifesavers, or chewing gum. Do not drink alcohol 24hrs before your surgery. Try not to smoke at least 24hrs before your surgery.       Follow the pre surgery showering instructions as listed in the “My Surgical Experience Booklet” or otherwise provided by your surgeon's office. Do not use a blade to shave the surgical area 1 week before surgery. It is okay to use a clean electric clippers up to 24 hours before surgery. Do not apply any lotions, creams, including makeup, cologne, deodorant, or perfumes after showering on the day of your surgery. Do not use dry shampoo, hair spray, hair gel, or any type of hair products.     No contact lenses, eye make-up, or artificial eyelashes.  Remove nail polish, including gel polish, and any artificial, gel, or acrylic nails if possible. Remove all jewelry including rings and body piercing jewelry.     Wear causal clothing that is easy to take on and off. Consider your type of surgery.    Keep any valuables, jewelry, piercings at home. Please bring any specially ordered equipment (sling, braces) if indicated.    Arrange for a responsible person to drive you to and from the hospital on the day of your surgery. Visitor Guidelines discussed.     Call the surgeon's office with any new illnesses, exposures, or additional questions prior to surgery.    Please reference your “My Surgical Experience Booklet” for additional information to prepare for your upcoming surgery.

## 2023-12-20 ENCOUNTER — TELEPHONE (OUTPATIENT)
Age: 63
End: 2023-12-20

## 2023-12-20 NOTE — TELEPHONE ENCOUNTER
Caller: patient    Doctor: Kvng    Reason for call: returning Christina's call.  No note in chart about the call    Call back#: [phone]

## 2023-12-21 ENCOUNTER — TELEPHONE (OUTPATIENT)
Dept: FAMILY MEDICINE CLINIC | Facility: CLINIC | Age: 63
End: 2023-12-21

## 2023-12-21 NOTE — TELEPHONE ENCOUNTER
referral for a specialist.  Jens Hernandez date of birth is [birth year].     [facility] The referral is for [facility] and the NPI is [id number] for an office visit today, December 21 D48.5. There's any questions? The office phone number is [phone]. Again, that's a specialist referral for [facility] for Jens Hernandez. Thank you.

## 2023-12-22 NOTE — TELEPHONE ENCOUNTER
Confirmation - Referral [id number]  Effective: 12/21/2023     Expires: 03/20/2024  Active  Referred From  [facility]  Specialty: [facility]  Group NPI: [id number]  Provider ID: [id number]  Tax ID: [id number]  [address] [place] [address]  Referred To  [facility]  Specialty: Dermatology  Tier 1  Group NPI: [id number]  Provider ID: [id number]  Tax ID: [id number]  This referral is valid at any location for the above group  Patient Info  LAMBERT TRIPATHI  [id number]  Male  1960 [address] [place] [address]  Clinical Information  Place of Service  Office  Service Type  Medical Care  Diagnosis  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

## 2023-12-28 ENCOUNTER — TELEPHONE (OUTPATIENT)
Dept: FAMILY MEDICINE CLINIC | Facility: CLINIC | Age: 63
End: 2023-12-28

## 2023-12-28 NOTE — TELEPHONE ENCOUNTER
Received request for referral from  Orthopedics for patient for 1/10/24.    Completed as follows:    Confrmation - Referral [id number] Effective: 12/28/2023 Expires: 03/27/2024 Referred From [facility] Specialty: [facility] Group NPI: [id number] Provider ID: [id number] Tax ID: [id number] [address] [place] [address] Referred To [facility] Specialty: Multi-Specialty Group Group NPI: [id number] Provider ID: [id number] Tax ID: [id number] Individual Provider NPI: [id number] Provider Name: MONIQUE MENA, DO This referral is valid at any location for the above group Patient Info LAMBERT TRIPATHI [id number] Male 1960 [address] [place] [id number] Clinical Information Place of Service Ofce Service Type Medical Care Diagnoses 1 M16.12- unilateral primary osteoarthritis, left hip Procedures 1 81608- arthroplasty, acetabular and proximal femoral prosthetic replacement (total hip arth

## 2024-01-10 ENCOUNTER — APPOINTMENT (OUTPATIENT)
Dept: RADIOLOGY | Facility: HOSPITAL | Age: 64
End: 2024-01-10
Payer: COMMERCIAL

## 2024-01-10 ENCOUNTER — HOSPITAL ENCOUNTER (OUTPATIENT)
Facility: HOSPITAL | Age: 64
Setting detail: OUTPATIENT SURGERY
Discharge: HOME | End: 2024-01-10
Attending: STUDENT IN AN ORGANIZED HEALTH CARE EDUCATION/TRAINING PROGRAM | Admitting: STUDENT IN AN ORGANIZED HEALTH CARE EDUCATION/TRAINING PROGRAM
Payer: COMMERCIAL

## 2024-01-10 ENCOUNTER — ANESTHESIA EVENT (OUTPATIENT)
Dept: PERIOP | Facility: HOSPITAL | Age: 64
End: 2024-01-10
Payer: COMMERCIAL

## 2024-01-10 ENCOUNTER — ANESTHESIA (OUTPATIENT)
Dept: PERIOP | Facility: HOSPITAL | Age: 64
End: 2024-01-10
Payer: COMMERCIAL

## 2024-01-10 VITALS
WEIGHT: 167.2 LBS | TEMPERATURE: 97.1 F | BODY MASS INDEX: 26.87 KG/M2 | HEIGHT: 66 IN | DIASTOLIC BLOOD PRESSURE: 74 MMHG | HEART RATE: 76 BPM | OXYGEN SATURATION: 96 % | SYSTOLIC BLOOD PRESSURE: 120 MMHG | RESPIRATION RATE: 14 BRPM

## 2024-01-10 DIAGNOSIS — M16.12 PRIMARY OSTEOARTHRITIS OF LEFT HIP: Primary | ICD-10-CM

## 2024-01-10 PROCEDURE — C1776 JOINT DEVICE (IMPLANTABLE): HCPCS | Performed by: STUDENT IN AN ORGANIZED HEALTH CARE EDUCATION/TRAINING PROGRAM

## 2024-01-10 PROCEDURE — 97163 PT EVAL HIGH COMPLEX 45 MIN: CPT

## 2024-01-10 PROCEDURE — 73501 X-RAY EXAM HIP UNI 1 VIEW: CPT

## 2024-01-10 PROCEDURE — 27130 TOTAL HIP ARTHROPLASTY: CPT | Performed by: STUDENT IN AN ORGANIZED HEALTH CARE EDUCATION/TRAINING PROGRAM

## 2024-01-10 PROCEDURE — 86850 RBC ANTIBODY SCREEN: CPT | Performed by: STUDENT IN AN ORGANIZED HEALTH CARE EDUCATION/TRAINING PROGRAM

## 2024-01-10 PROCEDURE — C1713 ANCHOR/SCREW BN/BN,TIS/BN: HCPCS | Performed by: STUDENT IN AN ORGANIZED HEALTH CARE EDUCATION/TRAINING PROGRAM

## 2024-01-10 PROCEDURE — 86901 BLOOD TYPING SEROLOGIC RH(D): CPT | Performed by: STUDENT IN AN ORGANIZED HEALTH CARE EDUCATION/TRAINING PROGRAM

## 2024-01-10 PROCEDURE — 86900 BLOOD TYPING SEROLOGIC ABO: CPT | Performed by: STUDENT IN AN ORGANIZED HEALTH CARE EDUCATION/TRAINING PROGRAM

## 2024-01-10 PROCEDURE — 97167 OT EVAL HIGH COMPLEX 60 MIN: CPT

## 2024-01-10 PROCEDURE — 27130 TOTAL HIP ARTHROPLASTY: CPT | Performed by: PHYSICIAN ASSISTANT

## 2024-01-10 PROCEDURE — 72170 X-RAY EXAM OF PELVIS: CPT

## 2024-01-10 PROCEDURE — 0054T BONE SRGRY CMPTR FLUOR IMAGE: CPT | Performed by: STUDENT IN AN ORGANIZED HEALTH CARE EDUCATION/TRAINING PROGRAM

## 2024-01-10 DEVICE — BIOLOX DELTA CERAMIC FEMORAL HEAD +5.0 36MM DIA 12/14 TAPER
Type: IMPLANTABLE DEVICE | Site: HIP | Status: FUNCTIONAL
Brand: BIOLOX DELTA

## 2024-01-10 DEVICE — EMPHASYS POLYETHYLENE LINER AOX NEUTRAL 52MM 36MM
Type: IMPLANTABLE DEVICE | Site: HIP | Status: FUNCTIONAL
Brand: EMPHASYS

## 2024-01-10 DEVICE — PINNACLE CANCELLOUS BONE SCREW 6.5MM X 30MM
Type: IMPLANTABLE DEVICE | Site: HIP | Status: FUNCTIONAL
Brand: PINNACLE

## 2024-01-10 DEVICE — ACTIS DUOFIX HIP PROSTHESIS (FEMORAL STEM 12/14 TAPER CEMENTLESS SIZE 5 STD COLLAR)  CE
Type: IMPLANTABLE DEVICE | Site: HIP | Status: FUNCTIONAL
Brand: ACTIS

## 2024-01-10 DEVICE — EMPHASYS ACETABULAR SHELL THREE-HOLE 52MM CEMENTLESS
Type: IMPLANTABLE DEVICE | Status: FUNCTIONAL
Brand: EMPHASYS

## 2024-01-10 RX ORDER — ONDANSETRON 2 MG/ML
INJECTION INTRAMUSCULAR; INTRAVENOUS AS NEEDED
Status: DISCONTINUED | OUTPATIENT
Start: 2024-01-10 | End: 2024-01-10

## 2024-01-10 RX ORDER — FENTANYL CITRATE/PF 50 MCG/ML
25 SYRINGE (ML) INJECTION
Status: DISCONTINUED | OUTPATIENT
Start: 2024-01-10 | End: 2024-01-10 | Stop reason: HOSPADM

## 2024-01-10 RX ORDER — CHLORHEXIDINE GLUCONATE 4 G/100ML
SOLUTION TOPICAL DAILY PRN
Status: DISCONTINUED | OUTPATIENT
Start: 2024-01-10 | End: 2024-01-10 | Stop reason: HOSPADM

## 2024-01-10 RX ORDER — ACETAMINOPHEN 500 MG
1000 TABLET ORAL EVERY 8 HOURS
Qty: 60 TABLET | Refills: 0 | Status: SHIPPED | OUTPATIENT
Start: 2024-01-10

## 2024-01-10 RX ORDER — SODIUM CHLORIDE, SODIUM LACTATE, POTASSIUM CHLORIDE, CALCIUM CHLORIDE 600; 310; 30; 20 MG/100ML; MG/100ML; MG/100ML; MG/100ML
125 INJECTION, SOLUTION INTRAVENOUS CONTINUOUS
Status: DISCONTINUED | OUTPATIENT
Start: 2024-01-10 | End: 2024-01-10 | Stop reason: HOSPADM

## 2024-01-10 RX ORDER — OXYCODONE HYDROCHLORIDE 5 MG/1
5 TABLET ORAL EVERY 4 HOURS PRN
Status: DISCONTINUED | OUTPATIENT
Start: 2024-01-10 | End: 2024-01-10 | Stop reason: HOSPADM

## 2024-01-10 RX ORDER — AMOXICILLIN 250 MG
1 CAPSULE ORAL DAILY
Qty: 30 TABLET | Refills: 0 | Status: SHIPPED | OUTPATIENT
Start: 2024-01-10

## 2024-01-10 RX ORDER — CALCIUM CARBONATE 500 MG/1
1000 TABLET, CHEWABLE ORAL DAILY PRN
Status: DISCONTINUED | OUTPATIENT
Start: 2024-01-10 | End: 2024-01-10 | Stop reason: HOSPADM

## 2024-01-10 RX ORDER — CHLORHEXIDINE GLUCONATE ORAL RINSE 1.2 MG/ML
15 SOLUTION DENTAL ONCE
Status: COMPLETED | OUTPATIENT
Start: 2024-01-10 | End: 2024-01-10

## 2024-01-10 RX ORDER — OXYCODONE HYDROCHLORIDE 5 MG/1
5 TABLET ORAL EVERY 4 HOURS PRN
Qty: 42 TABLET | Refills: 0 | Status: SHIPPED | OUTPATIENT
Start: 2024-01-10 | End: 2024-01-20

## 2024-01-10 RX ORDER — KETOROLAC TROMETHAMINE 30 MG/ML
INJECTION, SOLUTION INTRAMUSCULAR; INTRAVENOUS AS NEEDED
Status: DISCONTINUED | OUTPATIENT
Start: 2024-01-10 | End: 2024-01-10 | Stop reason: HOSPADM

## 2024-01-10 RX ORDER — MAGNESIUM HYDROXIDE/ALUMINUM HYDROXICE/SIMETHICONE 120; 1200; 1200 MG/30ML; MG/30ML; MG/30ML
30 SUSPENSION ORAL EVERY 6 HOURS PRN
Status: DISCONTINUED | OUTPATIENT
Start: 2024-01-10 | End: 2024-01-10 | Stop reason: HOSPADM

## 2024-01-10 RX ORDER — SODIUM CHLORIDE, SODIUM LACTATE, POTASSIUM CHLORIDE, CALCIUM CHLORIDE 600; 310; 30; 20 MG/100ML; MG/100ML; MG/100ML; MG/100ML
100 INJECTION, SOLUTION INTRAVENOUS CONTINUOUS
Status: DISCONTINUED | OUTPATIENT
Start: 2024-01-10 | End: 2024-01-10 | Stop reason: HOSPADM

## 2024-01-10 RX ORDER — PROPOFOL 10 MG/ML
INJECTION, EMULSION INTRAVENOUS CONTINUOUS PRN
Status: DISCONTINUED | OUTPATIENT
Start: 2024-01-10 | End: 2024-01-10

## 2024-01-10 RX ORDER — BUPIVACAINE HYDROCHLORIDE 7.5 MG/ML
INJECTION, SOLUTION INTRASPINAL AS NEEDED
Status: DISCONTINUED | OUTPATIENT
Start: 2024-01-10 | End: 2024-01-10

## 2024-01-10 RX ORDER — TRANEXAMIC ACID 100 MG/ML
INJECTION, SOLUTION INTRAVENOUS AS NEEDED
Status: DISCONTINUED | OUTPATIENT
Start: 2024-01-10 | End: 2024-01-10

## 2024-01-10 RX ORDER — CELECOXIB 200 MG/1
200 CAPSULE ORAL 2 TIMES DAILY
Qty: 60 CAPSULE | Refills: 0 | Status: SHIPPED | OUTPATIENT
Start: 2024-01-10

## 2024-01-10 RX ORDER — DEXAMETHASONE SODIUM PHOSPHATE 10 MG/ML
INJECTION, SOLUTION INTRAMUSCULAR; INTRAVENOUS AS NEEDED
Status: DISCONTINUED | OUTPATIENT
Start: 2024-01-10 | End: 2024-01-10

## 2024-01-10 RX ORDER — EPHEDRINE SULFATE 50 MG/ML
INJECTION INTRAVENOUS AS NEEDED
Status: DISCONTINUED | OUTPATIENT
Start: 2024-01-10 | End: 2024-01-10

## 2024-01-10 RX ORDER — ACETAMINOPHEN 325 MG/1
975 TABLET ORAL ONCE
Status: COMPLETED | OUTPATIENT
Start: 2024-01-10 | End: 2024-01-10

## 2024-01-10 RX ORDER — GABAPENTIN 300 MG/1
300 CAPSULE ORAL
Status: DISCONTINUED | OUTPATIENT
Start: 2024-01-10 | End: 2024-01-10 | Stop reason: HOSPADM

## 2024-01-10 RX ORDER — METOCLOPRAMIDE HYDROCHLORIDE 5 MG/ML
10 INJECTION INTRAMUSCULAR; INTRAVENOUS ONCE AS NEEDED
Status: DISCONTINUED | OUTPATIENT
Start: 2024-01-10 | End: 2024-01-10 | Stop reason: HOSPADM

## 2024-01-10 RX ORDER — CEFAZOLIN SODIUM 2 G/50ML
2000 SOLUTION INTRAVENOUS EVERY 8 HOURS
Status: DISCONTINUED | OUTPATIENT
Start: 2024-01-10 | End: 2024-01-10 | Stop reason: HOSPADM

## 2024-01-10 RX ORDER — HYDROMORPHONE HCL/PF 1 MG/ML
0.5 SYRINGE (ML) INJECTION
Status: DISCONTINUED | OUTPATIENT
Start: 2024-01-10 | End: 2024-01-10 | Stop reason: HOSPADM

## 2024-01-10 RX ORDER — DOCUSATE SODIUM 100 MG/1
100 CAPSULE, LIQUID FILLED ORAL 2 TIMES DAILY
Status: DISCONTINUED | OUTPATIENT
Start: 2024-01-10 | End: 2024-01-10 | Stop reason: HOSPADM

## 2024-01-10 RX ORDER — SENNOSIDES 8.6 MG
1 TABLET ORAL DAILY
Status: DISCONTINUED | OUTPATIENT
Start: 2024-01-10 | End: 2024-01-10 | Stop reason: HOSPADM

## 2024-01-10 RX ORDER — CEFADROXIL 500 MG/1
500 CAPSULE ORAL EVERY 12 HOURS SCHEDULED
Qty: 10 CAPSULE | Refills: 0 | Status: SHIPPED | OUTPATIENT
Start: 2024-01-10 | End: 2024-01-15

## 2024-01-10 RX ORDER — ACETAMINOPHEN 325 MG/1
975 TABLET ORAL EVERY 8 HOURS
Status: DISCONTINUED | OUTPATIENT
Start: 2024-01-10 | End: 2024-01-10 | Stop reason: HOSPADM

## 2024-01-10 RX ORDER — OXYCODONE HYDROCHLORIDE 5 MG/1
10 TABLET ORAL EVERY 4 HOURS PRN
Status: DISCONTINUED | OUTPATIENT
Start: 2024-01-10 | End: 2024-01-10 | Stop reason: HOSPADM

## 2024-01-10 RX ORDER — LIDOCAINE HYDROCHLORIDE 10 MG/ML
INJECTION, SOLUTION EPIDURAL; INFILTRATION; INTRACAUDAL; PERINEURAL AS NEEDED
Status: DISCONTINUED | OUTPATIENT
Start: 2024-01-10 | End: 2024-01-10

## 2024-01-10 RX ORDER — CEFAZOLIN SODIUM 2 G/50ML
2000 SOLUTION INTRAVENOUS ONCE
Status: COMPLETED | OUTPATIENT
Start: 2024-01-10 | End: 2024-01-10

## 2024-01-10 RX ORDER — BUPIVACAINE HYDROCHLORIDE AND EPINEPHRINE 2.5; 5 MG/ML; UG/ML
INJECTION, SOLUTION EPIDURAL; INFILTRATION; INTRACAUDAL; PERINEURAL AS NEEDED
Status: DISCONTINUED | OUTPATIENT
Start: 2024-01-10 | End: 2024-01-10 | Stop reason: HOSPADM

## 2024-01-10 RX ORDER — TRANEXAMIC ACID 10 MG/ML
1000 INJECTION, SOLUTION INTRAVENOUS ONCE
Status: COMPLETED | OUTPATIENT
Start: 2024-01-10 | End: 2024-01-10

## 2024-01-10 RX ORDER — ONDANSETRON 4 MG/1
4 TABLET, ORALLY DISINTEGRATING ORAL EVERY 6 HOURS PRN
Qty: 20 TABLET | Refills: 0 | Status: SHIPPED | OUTPATIENT
Start: 2024-01-10

## 2024-01-10 RX ORDER — ONDANSETRON 2 MG/ML
4 INJECTION INTRAMUSCULAR; INTRAVENOUS ONCE AS NEEDED
Status: DISCONTINUED | OUTPATIENT
Start: 2024-01-10 | End: 2024-01-10 | Stop reason: HOSPADM

## 2024-01-10 RX ORDER — MIDAZOLAM HYDROCHLORIDE 2 MG/2ML
INJECTION, SOLUTION INTRAMUSCULAR; INTRAVENOUS AS NEEDED
Status: DISCONTINUED | OUTPATIENT
Start: 2024-01-10 | End: 2024-01-10

## 2024-01-10 RX ORDER — ONDANSETRON 2 MG/ML
4 INJECTION INTRAMUSCULAR; INTRAVENOUS EVERY 6 HOURS PRN
Status: DISCONTINUED | OUTPATIENT
Start: 2024-01-10 | End: 2024-01-10 | Stop reason: HOSPADM

## 2024-01-10 RX ADMIN — CHLORHEXIDINE GLUCONATE 15 ML: 1.2 SOLUTION ORAL at 06:52

## 2024-01-10 RX ADMIN — LIDOCAINE HYDROCHLORIDE 2 ML: 10 INJECTION, SOLUTION EPIDURAL; INFILTRATION; INTRACAUDAL; PERINEURAL at 07:36

## 2024-01-10 RX ADMIN — MIDAZOLAM 0.5 MG: 1 INJECTION INTRAMUSCULAR; INTRAVENOUS at 07:50

## 2024-01-10 RX ADMIN — SODIUM CHLORIDE, SODIUM LACTATE, POTASSIUM CHLORIDE, AND CALCIUM CHLORIDE 125 ML/HR: .6; .31; .03; .02 INJECTION, SOLUTION INTRAVENOUS at 06:53

## 2024-01-10 RX ADMIN — ONDANSETRON 4 MG: 2 INJECTION INTRAMUSCULAR; INTRAVENOUS at 07:44

## 2024-01-10 RX ADMIN — EPHEDRINE SULFATE 10 MG: 50 INJECTION INTRAVENOUS at 08:35

## 2024-01-10 RX ADMIN — PROPOFOL 100 MCG/KG/MIN: 10 INJECTION, EMULSION INTRAVENOUS at 07:45

## 2024-01-10 RX ADMIN — CEFAZOLIN SODIUM 2000 MG: 2 SOLUTION INTRAVENOUS at 07:47

## 2024-01-10 RX ADMIN — TRANEXAMIC ACID 1000 MG: 10 INJECTION, SOLUTION INTRAVENOUS at 06:53

## 2024-01-10 RX ADMIN — BUPIVACAINE HYDROCHLORIDE IN DEXTROSE 1.6 ML: 7.5 INJECTION, SOLUTION SUBARACHNOID at 07:36

## 2024-01-10 RX ADMIN — TRANEXAMIC ACID 1000 MG: 100 INJECTION INTRAVENOUS at 07:35

## 2024-01-10 RX ADMIN — DEXAMETHASONE SODIUM PHOSPHATE 10 MG: 10 INJECTION, SOLUTION INTRAMUSCULAR; INTRAVENOUS at 07:53

## 2024-01-10 RX ADMIN — MIDAZOLAM 2 MG: 1 INJECTION INTRAMUSCULAR; INTRAVENOUS at 07:33

## 2024-01-10 RX ADMIN — EPHEDRINE SULFATE 5 MG: 50 INJECTION INTRAVENOUS at 08:23

## 2024-01-10 RX ADMIN — ACETAMINOPHEN 975 MG: 325 TABLET, FILM COATED ORAL at 06:50

## 2024-01-10 NOTE — ANESTHESIA PREPROCEDURE EVALUATION
Procedure:  ARTHROPLASTY HIP TOTAL ANTERIOR,NAVIGATED- SAME DAY (Left: Hip)    Relevant Problems   MUSCULOSKELETAL   (+) Primary osteoarthritis of left hip      Musculoskeletal and Integument   (+) Atypical nevi      Other   (+) Arthralgia of multiple joints      Lab Results   Component Value Date    SODIUM 139 12/12/2023    K 4.5 12/12/2023     12/12/2023    CO2 23 12/12/2023    BUN 15 12/12/2023    CREATININE 1.00 12/12/2023    GLUC 100 (H) 12/12/2023    AST 20 12/12/2023    ALT 13 12/12/2023    TP 6.7 12/12/2023    TBILI 0.6 12/12/2023    EGFR 85 12/12/2023     Lab Results   Component Value Date    WBC 6.6 12/12/2023    HGB 15.4 12/12/2023    HCT 46.3 12/12/2023    MCV 91 12/12/2023     12/12/2023         Physical Exam    Airway    Mallampati score: II  TM Distance: >3 FB  Neck ROM: full     Dental   No notable dental hx     Cardiovascular      Pulmonary      Other Findings        Anesthesia Plan  ASA Score- 1     Anesthesia Type- spinal with ASA Monitors.         Additional Monitors:     Airway Plan:            Plan Factors-Exercise tolerance (METS): >4 METS.    Chart reviewed. EKG reviewed. Imaging results reviewed. Existing labs reviewed. Patient summary reviewed.                  Induction-     Postoperative Plan-     Informed Consent- Anesthetic plan and risks discussed with patient.  I personally reviewed this patient with the CRNA. Discussed and agreed on the Anesthesia Plan with the CRNA..

## 2024-01-10 NOTE — ANESTHESIA PROCEDURE NOTES
Spinal Block    Patient location during procedure: OR  Start time: 1/10/2024 7:36 AM  Reason for block: primary anesthetic  Staffing  Performed by: Marylou Conway CRNA  Authorized by: Mohamud Patino MD    Preanesthetic Checklist  Completed: patient identified, IV checked, site marked, risks and benefits discussed, surgical consent, monitors and equipment checked, pre-op evaluation and timeout performed  Spinal Block  Patient position: sitting  Prep: ChloraPrep  Patient monitoring: heart rate, continuous pulse ox and frequent blood pressure checks  Approach: midline  Location: L4-5  Injection technique: single-shot  Needle  Needle type: pencil-tip   Needle gauge: 25 G  Needle length: 10 cm  Assessment  Sensory level: T10  Injection Assessment:  negative aspiration for heme, no paresthesia on injection and positive aspiration for clear CSF.  Post-procedure:  site cleaned  Additional Notes  Patient tolerated well

## 2024-01-10 NOTE — ANESTHESIA POSTPROCEDURE EVALUATION
Post-Op Assessment Note    CV Status:  Stable    Pain management: adequate (unable to wiggle toes.)       Mental Status:  Awake and sleepy (Responds to name and questioning,)   Hydration Status:  Euvolemic   PONV Controlled:  Controlled   Airway Patency:  Patent     Post Op Vitals Reviewed: Yes      Staff: CRNA               BP 96/65 (01/10/24 0912)    Temp (!) 97.1 °F (36.2 °C) (01/10/24 0912)    Pulse 58 (01/10/24 0912)   Resp 16 (01/10/24 0912)    SpO2 100 % (01/10/24 0912)

## 2024-01-10 NOTE — DISCHARGE INSTR - AVS FIRST PAGE
Dr. Calderon Hip Replacement    What to Expect/Activity  You are weight bearing as tolerated to your operative leg unless otherwise instructed. Use your walker or crutches. It is important to get up and walk for 10 minutes every hour, if possible.  Initial recovery therapy is focused on walking. If in your follow-up a referral to formal physical therapy is required, this will be provided based on your recovery.  You may sleep however you would like. Many patients feel more comfortable with a pillow between their legs and find it uncomfortably to lay on the operative side. If you do roll on that side at night you will not damage the replacement.  You may use a regular or elevated toilet seat, whichever is more comfortable.  We do not routinely require any specific precautions in terms of positioning of your leg and if so this will be taught to you by the physical therapists at the hospital. This means that you can dress as you are comfortable, including shoes and socks. You can bend down carefully to get items from the floor.   Swelling and discomfort causes pain. Elevate your surgical leg on 1-2 pillows placed behind your ankle/calf throughout the day, especially when you are laying down.  Please use ice packs for 20-30 minutes every 1-2 hours for 48-72 hours on the operative hip. Please make sure there is a barrier directly between your skin and your ice/ice pack.  Please use incentive spirometer 10 times per hour while awake (see diagram below).    Dressing/Wound Care/Bathing  There is a surgical dressing over your incision that stays in place until follow-up unless it starts to peel off.   You may start showering 24 hours after surgery, the surgical dressing will remain in place. Please pat the dressing dry. If you notice the dressing appears saturated or is starting to come off, please replace with a dry dressing.  Keep the dressing on until your follow-up in the office.   There may be dried blood around the  incision. It is ok to continue showering after removing the dressing but do not scrub the incision. Pat incision dry.  Do not place any creams, ointments or gels on or around the incision.  No baths, swimming or submerging until cleared by Dr. Calderon.    Pain Management/Medications  You may resume your usual medications.  Please take the following medications:  Anti-coagulation (blood clot prevention) - aspirin 81mg twice daily for 4 weeks  Pain medication:  Narcotic Medication: Take as directed  NSAID (Anti-inflammatory): Take as directed  Tylenol 1000mg every 8 hours  Zofran (ondasetron) - 4mg every 8 hours as needed for nausea  Stool Softeners (senna/colace)- take daily to help prevent constipation as narcotic pain medication causes constipation  Antibiotic - take as directed if prescribed  If you have questions or pain concerns, please contact the office. Pain medication cannot remove all post-operative pain.    Follow up/Call if:  The findings of your surgery will be explained to you and your family immediately after surgery. However, in the post-operative period, during recovery from anesthesia you may not fully remember or fully understand what was said. We will go over this again when you return for your post-op appointment.  Please contact Dr. Calderon's office if you experience the following:  Excessive bleeding (bleeding through your dressing)  Fever greater than 101 degrees F after the first 2 days (a slight fever is normal the first few days after surgery)  Persistent nausea or vomiting  Decreased sensation or discoloration of the operative limb  Pain or swelling that is getting worse and not better with medication    Dr. Calderon's Office Contact: [phone]

## 2024-01-10 NOTE — PHYSICAL THERAPY NOTE
"   PHYSICAL THERAPY EVALUATION  DATE: 01/10/24  TIME: 7948-1787    NAME:  Jens Hernandez  AGE:   63 y.o.  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\"    Right hip pain     RLS (restless legs syndrome)     Wears glasses      Past Surgical History:   Procedure Laterality Date    COLONOSCOPY      HERNIA REPAIR  2000    [place] ARTHRP ACETBLR/PROX FEM PROSTC AGRFT/ALGRFT Right 5/31/2023    Procedure: ARTHROPLASTY HIP TOTAL ANTERIOR,NAVIGATED- SAME DAY;  Surgeon: David Calderon DO;  Location:  MAIN OR;  Service: Orthopedics    TONSILLECTOMY      VASECTOMY         Performed at least 2 patient identifiers during session: Name, Birthday, ID bracelet, and Epic photo     01/10/24 1230   PT Last Visit   PT Visit Date 01/10/24   Note Type   Note type Evaluation   Pain Assessment   Pain Assessment Tool 0-10   Pain Score No Pain   Restrictions/Precautions   Weight Bearing Precautions Per Order Yes   LLE Weight Bearing Per Order WBAT  (s/p L anterior JEEVAN 1/10/24 by Dr. Calderon)   Other Precautions THR;Fall Risk   Home Living   Type of Home House   Home Layout One level;Stairs to enter without rails  (1 LEVON, maintains single level living)   Bathroom Shower/Tub Walk-in shower   Bathroom Toilet Standard   Bathroom Equipment Grab bars in shower;Shower chair   Bathroom Accessibility Accessible via walker   Home Equipment Walker;Cane   Prior Function   Level of [place] Independent with ADLs;Independent with functional mobility;Independent with IADLS   Lives With Family  (spouse and 2 adult dtrs)   Receives Help From Family   IADLs Independent with driving;Independent with meal prep;Independent with medication management   Falls in the last 6 months 1 to 4  (1 recent fall off haybales)   Vocational Full time employment   Comments Pt " "reports that prior to surgery, pt was fully independent with all aspects of self care and functional mobility of household and community distances with no AD. Completes farm/heavy work on regular basis.   General   Additional Pertinent History Pt is a 63 yr old male s/p L anterior JEEVAN 1/10/24 by Dr. Calderon, WBAT.   Family/Caregiver Present No   Cognition   Overall Cognitive Status WFL   Arousal/Participation Cooperative   Orientation Level Oriented X4   Memory Within functional limits   Following Commands Follows all commands and directions without difficulty   Subjective   Subjective \"I didn't do formal PT after my last one. 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.\"   RUE Assessment   RUE Assessment WFL   LUE Assessment   LUE Assessment WFL   RLE Assessment   RLE Assessment WFL   LLE Assessment   LLE Assessment WFL   Vision-Basic Assessment   Current Vision Wears glasses all the time   Coordination   Movements are Fluid and Coordinated 1   Sensation WFL   Light Touch   RLE Light Touch Grossly intact   LLE Light Touch Grossly intact   Proprioception   RLE Proprioception Grossly intact   LLE Proprioception Grossly Intact   Bed Mobility   Supine to Sit 6  Modified independent   Additional items HOB elevated   Sit to Supine   (NT as pt was left seated OOB in chair at end of session)   Additional Comments No significant change in vitals with change in positioning, denies dizziness.   Transfers   Sit to Stand 6  Modified independent   Additional items Increased time required;Verbal cues   Stand to Sit 6  Modified independent   Additional items Increased time required   Stand pivot 6  Modified independent   Additional items   (RW)   Additional Comments Initially S for transfers, progressing to FALLON with increased reps t/o session.   Ambulation/Elevation   Gait pattern Antalgic;Forward Flexion;Decreased foot clearance;Short stride;Decreased hip extension;Decreased heel strike  (step to pattern " progressing to step through pattern)   Gait Assistance 6  Modified independent  (initially S progressing to FALLON)   Additional items Assist x 1   Assistive Device Rolling walker   Distance 150ft + 100ft   Stair Management Assistance 5  Supervision   Additional items Assist x 1;Verbal cues;Increased time required   Stair Management Technique Foreward;With walker   Number of Stairs 2  (up/down standard height step with RW to simulate entrance to his home)   Balance   Static Sitting Normal   Dynamic Sitting Good   Static Standing Fair +  (w/ RW)   Dynamic Standing Fair +  (w/ RW)   Ambulatory Fair +  (w/ RW)   Endurance Deficit   Endurance Deficit No   Activity Tolerance   Activity Tolerance Patient tolerated treatment well   Medical Staff Made Aware Spoke with OT   Nurse Made Aware Spoke with RN Dilcia and Dr. Calderon   Assessment   Prognosis Excellent   Assessment Pt seen for PT evaluation POD0 s/p elective L anterior JEEVAN by Dr. Calderon on 1/10/2024. Evaluated deficits include: restricted ROM, decreased strength, WBing intolerance, and gait deviations/abnormal gait. Pt was educated on importance of frequent mobility and OPPT f/u. Pt verbalizes good understanding. The AM-PAC and Barthel Index Outcome Assessments were utilized in best determining safe d/c recommendations. Pt is at risk of falls d/t h/o falls, impaired balance, use of ambulatory aid, varying levels of pain , acuity of medical illness, ongoing medical treatment of primary dx, and polypharmacy. Despite pt's ongoing and anticipated post op total joint replacement impairments, pt displays good safety with mobility and post op care for return to home POD0, with family support and OPPT follow up.   Barriers to Discharge None   Goals   Patient Goals to go home POD0   PT Treatment Day 0   Discharge Recommendation   Rehab Resource Intensity Level, PT III (Minimum Resource Intensity)  (OPPT)   AM-PAC Basic Mobility Inpatient   Turning in Flat Bed Without Bedrails 4    Lying on Back to Sitting on Edge of Flat Bed Without Bedrails 4   Moving Bed to Chair 3   Standing Up From Chair Using Arms 4   Walk in Room 3   Climb 3-5 Stairs With Railing 3   Basic Mobility Inpatient Raw Score 21   Basic Mobility Standardized Score 45.55   Highest Level Of Mobility   JH-HLM Goal 6: Walk 10 steps or more   JH-HLM Achieved 8: Walk 250 feet ot more   Modified [place] Scale   Modified Christopher Scale 2   Barthel Index   Feeding 10   Bathing 5   Grooming Score 5   Dressing Score 10   Bladder Score 5   Bowels Score 10   Toilet Use Score 5   Transfers (Bed/Chair) Score 10   Mobility (Level Surface) Score 10   Stairs Score 5   Barthel Index Score 75   End of Consult   Patient Position at End of Consult Bedside chair;All needs within reach     Based on patient's [facility] Highest Level of Mobility scores today, patient currently has a goal of -HLM Levels: 8: WALK 250 FEET OR MORE, to be completed with RN staffing each shift, in order to improve overall activity tolerance and mobility, combat hospital related deconditioning, and maximize outcomes for d/c from the acute care setting.     The patient's AM-PAC Basic Mobility Inpatient Short Form Raw Score is 21. A Raw score of greater than 16 suggests the patient may benefit from discharge to home. Please also refer to the recommendation of the Physical Therapist for safe discharge planning.      Salnia Aguirre PT, DPT   Available via [employer]  NP # [phone]  [place] License - [id number]  1/10/2024

## 2024-01-10 NOTE — OCCUPATIONAL THERAPY NOTE
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\"    Right hip pain     RLS (restless legs syndrome)     Wears glasses      Past Surgical History  Past Surgical History:   Procedure Laterality Date    COLONOSCOPY      HERNIA REPAIR  2000    [place] ARTHRP ACETBLR/PROX FEM PROSTC AGRFT/ALGRFT Right 5/31/2023    Procedure: ARTHROPLASTY HIP TOTAL ANTERIOR,NAVIGATED- SAME DAY;  Surgeon: David Calderon DO;  Location:  MAIN OR;  Service: Orthopedics    TONSILLECTOMY      VASECTOMY             01/10/24 1212   OT Last Visit   OT Visit Date 01/10/24   Note Type   Note type Evaluation   Pain Assessment   Pain Assessment Tool 0-10   Restrictions/Precautions   Weight Bearing Precautions Per Order Yes   LLE Weight Bearing Per Order WBAT   Other Precautions THR   Home Living   Type of Home House   Home Layout One level   Bathroom Shower/Tub Walk-in shower   Bathroom Toilet Standard   Bathroom Equipment Grab bars in shower;Shower chair   Bathroom Accessibility Accessible   Home Equipment Walker;Cane   Prior Function   Level of [place] Independent with ADLs;Independent with functional mobility;Independent with IADLS   Lives With Spouse;Daughter   Receives Help From Family   IADLs Independent with driving;Independent with meal prep;Independent with medication management   Falls in the last 6 months 1 to 4  (1 fall off hay bail)   Vocational Full time employment   Lifestyle   Autonomy pta pt was (I) c ADLs and IADLs, (+) , 1 fall, lives w spouse   Reciprocal Relationships spouse and 2 DTR's   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   General   Additional Pertinent History S/p L THR   Family/Caregiver Present No "   Additional General Comments pt was pleasant   ADL   Where Assessed Edge of bed   Eating Assistance 7  Independent   Grooming Assistance 6  Modified Independent   UB Bathing Assistance 6  Modified Independent   LB Bathing Assistance 5  Supervision/Setup   UB Dressing Assistance 6  Modified independent   LB Dressing Assistance 6  Modified independent   Toileting Assistance  6  Modified independent   Bed Mobility   Supine to Sit 6  Modified independent   Additional items Increased time required   Transfers   Sit to Stand 5  Supervision   Stand to Sit 5  Supervision   Toilet transfer 5  Supervision   Functional Mobility   Functional Mobility 5  Supervision  (progressing to mod I)   Additional Comments household distance   Additional items Rolling walker   Balance   Static Sitting Normal   Dynamic Sitting Good   Static Standing Fair +   Dynamic Standing Fair +   Ambulatory Fair +   Activity Tolerance   Activity Tolerance Patient tolerated treatment well   Medical Staff Made Aware PT Becki   Nurse Made Aware RN aware, Dr. Calderon   RUANGELY Assessment   RUE Assessment WFL   LUE Assessment   LUE Assessment WFL   Vision-Basic Assessment   Current Vision Wears glasses all the time   Psychosocial   Psychosocial (WDL) WDL   Cognition   Overall Cognitive Status WFL   Arousal/Participation Alert;Cooperative   Attention Within functional limits   Orientation Level Oriented X4   Memory Within functional limits   Following Commands Follows all commands and directions without difficulty   Assessment   Limitation Decreased high-level ADLs   Prognosis Good   Assessment Pt is a 63 y.o. male seen for OT evaluation s/p admission to [facility] on 1/10/2024 due to s/p L THR. Diagnosed with Primary osteoarthritis of left hip. Personal and env factors supporting pt at time of IE include age, (I) PLOF, supportive family, and attitude towards recovery. Personal and env factors inhibiting engagement in occupations include difficulty completing IADLs.  Performance deficits that affect the pt’s occupational performance can be seen above.   Despite pt's current functional limitations and medical complications pt is functioning near baseline. No further acute OT needs identified at this time. Recommend continued active ADL participation and mobilization with hospital staff while in the hospital to increase pt’s endurance and strength upon D/C. From OT standpoint, recommend D/C to home with family support when medically cleared. D/C pt from OT caseload at this time.   Goals   Patient Goals to go home   Plan   OT Frequency Eval only   Discharge Recommendation   Rehab Resource Intensity Level, OT No post-acute rehabilitation needs   AM-PAC Daily Activity Inpatient   Lower Body Dressing 4   Bathing 4   Toileting 4   Upper Body Dressing 4   Grooming 4   Eating 4   Daily Activity Raw Score 24   Daily Activity Standardized Score (Calc for Raw Score >=11) 57.54   AM-PAC Applied Cognition Inpatient   Following a Speech/Presentation 4   Understanding Ordinary Conversation 4   Taking Medications 4   Remembering Where Things Are Placed or Put Away 4   Remembering List of 4-5 Errands 4   Taking Care of Complicated Tasks 4   Applied Cognition Raw Score 24   Applied Cognition Standardized Score 62.21   End of Consult   Education Provided Yes   Patient Position at End of Consult Bedside chair;Bed/Chair alarm activated;All needs within reach   Nurse Communication Nurse aware of consult       The patient's raw score on the AM-PAC Daily Activity Inpatient Short Form is 24. A raw score of greater than or equal to 19 suggests the patient may benefit from discharge to home. Please refer to the recommendation of the Occupational Therapist for safe discharge planning.     Shavon Dahl OTR/L

## 2024-01-10 NOTE — INTERVAL H&P NOTE
H&P reviewed. After examining the patient I find no changes in the patients condition since the H&P had been written. Plan for left JEEVAN.

## 2024-01-10 NOTE — OP NOTE
OPERATIVE REPORT  PATIENT NAME: Jens Hernandez  : 1960  MRN: [id number]  Pt Location:  [facility] OR ROOM 02    Surgery Date: 1/10/2024    Surgeons and Role:     * David Calderon,  - Primary     * Myra Singer PA-C - Assisting     Preop Diagnosis:  Primary osteoarthritis of one hip, left [M16.12]    Post-Op Diagnosis Codes:     * Primary osteoarthritis of one hip, left [M16.12]    Procedure(s):  Left - ARTHROPLASTY HIP TOTAL ANTERIOR.NAVIGATED- SAME DAY    Specimens:  * No specimens in log *    Estimated Blood Loss:   100 mL    Drains:  * No LDAs found *    Anesthesia Type:   Spinal     Operative Indications:  Primary osteoarthritis of one hip, left [M16.12]  63M with left hip pain 2/2 severe osteoarthritis. Patient has tried and failed conservative treatment options for the left hip including NSAIDs, tylenol, HEP, and activity modifications. The patient has elected to proceed with left JEEVAN through the anterior approach. Risks and benefits of surgery to include but not limited to bleeding, infection, damage to surrounding structures, hardware failure, instability, fracture, dislocation, leg length inequality, need for further surgery, continued pain, stiffness, blood clots, stroke, heart attack, and LFCN numbness was discussed with the patient.     Operative Findings:  Severe osteoarthritis   Native femoral head 52 mm    Implant Name Type Inv. Item Serial No.  Lot No. LRB No. Used Action   ACETABULAR SHELL 3HL 52MM CMNTLS EMPHASYS - [id number]  ACETABULAR SHELL 3HL 52MM CMNTLS EMPHASYS  DEPUY [id number] Left 1 Implanted   SCREW HEAVENLY 6.5 X 30MM SLF TAP PINNACLE - [id number]  SCREW HEAVENLY 6.5 X 30MM SLF TAP PINNACLE  DEPUY [id number] Left 1 Implanted   ACETABULAR SHELL 3HL 52MM CMNTLS EMPHASYS - [id number]  ACETABULAR SHELL 3HL 52MM CMNTLS EMPHASYS  DEPUY [id number] Left 1 Implanted   STEM FEM SZ 5 TAPER CMNTLS STD COLLAR ACTIS - [id number]  STEM FEM SZ 5 TAPER CMNTLS STD COLLAR ACTIS  DEPUY [id number] Left  1 Implanted   HEAD FEMORAL CMNTLS 12/14 TPR 36MM +5MM BIOLOX DELTA ARTICULEZE - [id number]  HEAD FEMORAL CMNTLS 12/14 TPR 36MM +5MM BIOLOX DELTA ARTICULEZE  DEPUY [id number] Left 1 Implanted   LINER AOX NEUTRAL 52 X 36MM EMPHASYS - [id number]  LINER AOX NEUTRAL 52 X 36MM EMPHASYS  DEPUY [id number] Left 1 Implanted     Complications:   None    Hip Approach: Direct anterior    Procedure and Technique:  The patient seen in the preoperative area. The informed consent was confirmed.  The operative site was confirmed and marked.  Patient was taken to the operating room and anesthesia was performed by the anesthesiologist.  The patient's bilateral lower extremities were well padded and placed in the in the hana table boots.  Patient was secured to the operating room table and all bony prominences were well padded.  Patient was given perioperative antibiotics per scip protocol.  A formal time-out was performed identifying the patient and correct surgical site.  The left lower extremity was prepped and draped in usual sterile fashion.  A 10 cm incision was made anteriorly approximately 2 fingerbreadths lateral and 3 fingerbreadths distal to ASIS avoiding the hip flexion crease sharply with a 10 blade.  Electrocautery was used to cauterize skin bleeders.  This was carried down through subcutaneous tissues to the level of the tensor fascia cindy fascia.  Fascia was incised in line with the muscle fibers.  The tensor fascia cindy muscle was then moved laterally and the inferior fascia was incised.  The branches of the lateral femoral cutaneous vessles were coagulated with electrocautery.  Retractors were placed carefully around the femoral neck.  Pericapsular fat was removed.  The anterior capsule was removed in its entirety.  Retractors were then carefully placed around the femoral neck.  Femoral neck was resected in line with our template.  A napkin ring was performed to better facilitate removal of the head.  Femoral head was  removed with corkscrew.  Femoral head measured approximately 52 mm but was deformed.  At this time retractors were carefully placed around the acetabulum.  The labrum was removed sharply. The inferior capsule was released.  Pulvinar was was removed to expose the medial wall of the acetabulum.  We started sequentially reaming at a size 47 medialized the medial wall.  We then sequentially reamed up to a size 52 with excellent chatter the Reamer.  We then impacted the Emphasys cluster hole 52 mm cup into place with the guidance of the InnoCyteys hip navigation.  Our cup was placed at approximately 40° of abduction and 20 anteversion per navigation guidance.  Cup was well fixed and we placed the neutral Emphasys liner.  The liner was inspected and assured to be well seated.  At this time all the retractors were removed and attention was drawn to the femur.  Retractors were placed over the greater trochanter and along the medial calcar exposing the top of the femur. Medial capsule was released off the calcar and lateral capsule was resected allowing elevation of the femur into the wound.  We used the canal finer to localize the canal.  We then started broaching sequentially up to a size 5 Actis.  The size 5 had excellent rotational stability and canal fill.  We calcar planed down to the level of stem.  We trialed the hip with std offset with a +1.5 and +5 trial head.  The hip had excellent stability to 30° of internal rotation and 90° of external rotation with restoration of leg length and offset with the std offset and +5 head.  This was confirmed with intraoperative navigation via the RazorGator hip navigation platform.  The trial femoral stem was removed and the real size 5 std offset stem was impacted into place.  The +5 36 mm ceramic head was impacted onto a dry trunnion.  At this time the hip was lavaged with Irrisept solution.  Hip was then irrigated with remainder of 3 L of normal saline solution.  The deep tissues were  again inspected for any bleeding from the lateral femoral cutaneous vessels or muscle bleeders.  Any bleeders were cauterized with electrocautery.  The fascia was then closed with interrupted 0 Vicryl followed by running #1 Stratafix suture.  The subcutaneous tissue was closed with interrupted 2-0 Vicryl.  The skin was then closed with running 4-0 Stratafix.  A Prineo dressing was placed sterilely.  The patient was awoken from anesthesia and taken to recovery room in stable condition.         I was present for the entire case incision to closure.  Myra Singer PA-C was necessary for positioning, retracting, and suturing.  No qualified resident was available for the case.     Patient Disposition:  PACU       63M s/p L JEEVAN 1/10  - multi-modal pain control   - rudy-op abx x 24hrs, duricef x 5 days as same day discharge   - DVT ppx: asprin 81mg BID x 4 weeks  - PT/OT  - WBAT  - No formal hip precautions, no extremes of motion  - keep dressing in place until follow-up  - f/u 10-14 days      SIGNATURE: David Calderon DO  DATE: January 10, 2024  TIME: 3:00 PM

## 2024-01-11 NOTE — TELEPHONE ENCOUNTER
"Patient contacted for a postoperative follow up assessment. Patient reports doing \"good.\" Patient states current pain level of a 7/10  when sitting and 5/10 when walking with RW. Patient denies increase in swelling and dressing is clean, dry and intact. Patient is icing the site regularly.     Confirmed upcoming appointments with patient:   PT: Dr. Calderon case  Post op: 1/19    We reviewed patients AVS medication list. Patient is taking Tylenol 1000mg every 8 hours, ASA 81mg BID, Duricef 500mg every 12 hours x5 days, Zofran 4mg PRN. Patient has had a BM, states he is not using Senokot 8.6-50mg. Pt states he did not  Celebrex or Oxycodone rx from pharmacy, \"I did not need those medications after my last hip surgery.\" Discussed postop constipation, even if not using Oxycodone, for decrease mobility. Encouraged increasing fluids/fiber and starting OTC stool softner if needed.     Pt reports a \"brief episode of dizziness and sweating this am but didn't last long.\" Pt confirmed he is eating and drinking fine. Instructed to contact office back with any returning symptoms.     Patient denies nausea, vomiting, abdominal pain, chest pain, shortness of breath, fever, and calf pain. Patient does not have any other questions or concerns at this time. Pt was encouraged to call with any questions, concerns or issues.    "

## 2024-01-19 ENCOUNTER — APPOINTMENT (OUTPATIENT)
Dept: RADIOLOGY | Facility: CLINIC | Age: 64
End: 2024-01-19
Payer: COMMERCIAL

## 2024-01-19 ENCOUNTER — OFFICE VISIT (OUTPATIENT)
Dept: OBGYN CLINIC | Facility: CLINIC | Age: 64
End: 2024-01-19

## 2024-01-19 VITALS
HEIGHT: 66 IN | WEIGHT: 167 LBS | HEART RATE: 60 BPM | SYSTOLIC BLOOD PRESSURE: 127 MMHG | BODY MASS INDEX: 26.84 KG/M2 | DIASTOLIC BLOOD PRESSURE: 85 MMHG

## 2024-01-19 DIAGNOSIS — Z47.1 AFTERCARE FOLLOWING LEFT HIP JOINT REPLACEMENT SURGERY: ICD-10-CM

## 2024-01-19 DIAGNOSIS — Z96.642 AFTERCARE FOLLOWING LEFT HIP JOINT REPLACEMENT SURGERY: ICD-10-CM

## 2024-01-19 DIAGNOSIS — Z96.642 AFTERCARE FOLLOWING LEFT HIP JOINT REPLACEMENT SURGERY: Primary | ICD-10-CM

## 2024-01-19 DIAGNOSIS — Z47.1 AFTERCARE FOLLOWING LEFT HIP JOINT REPLACEMENT SURGERY: Primary | ICD-10-CM

## 2024-01-19 PROCEDURE — 73502 X-RAY EXAM HIP UNI 2-3 VIEWS: CPT

## 2024-01-19 PROCEDURE — 99024 POSTOP FOLLOW-UP VISIT: CPT | Performed by: STUDENT IN AN ORGANIZED HEALTH CARE EDUCATION/TRAINING PROGRAM

## 2024-01-19 NOTE — PROGRESS NOTES
Subjective: 63 y.o. male presents to the office 9 days s/p left anterior total hip arthroplasty performed on 01/10/2024. Patient seen and examined. Pain controlled. He has been icing and elevating which has been beneficial. Progressing well. He has transitioned from the walker to a cane for ambulation. Incision without drainage. Denies fevers or chills    Physical Exam:  Incision: clean, dry, and intact  ROM: as expected  5/5 IP/Q/HS/TA/GS, 2+ DP/PT, SILT DP/SP/S/S/TN    XR left hip: s/p press-fit total hip arthroplasty, components in good position. No fracture or dislocation      Assessment/Plan:  9 days s/p left anterior total hip arthroplasty performed on 01/10/2024    - continue multi-modal pain control   - Weight bearing status: as tolerated  - DVT ppx: aspirin 81 mg BID for an additional 3 weeks (4 weeks total)  - Incision care: avoid submerging and scrubbing  - PT/OT  - F/U in 4-5 weeks    Scribe Attestation      I,:   am acting as a scribe while in the presence of the attending physician.:       I,:   personally performed the services described in this documentation    as scribed in my presence.:

## 2024-02-16 ENCOUNTER — OFFICE VISIT (OUTPATIENT)
Dept: OBGYN CLINIC | Facility: CLINIC | Age: 64
End: 2024-02-16

## 2024-02-16 VITALS
DIASTOLIC BLOOD PRESSURE: 83 MMHG | SYSTOLIC BLOOD PRESSURE: 136 MMHG | HEIGHT: 66 IN | WEIGHT: 167 LBS | HEART RATE: 80 BPM | BODY MASS INDEX: 26.84 KG/M2

## 2024-02-16 DIAGNOSIS — Z96.642 AFTERCARE FOLLOWING LEFT HIP JOINT REPLACEMENT SURGERY: Primary | ICD-10-CM

## 2024-02-16 DIAGNOSIS — Z96.641 HISTORY OF TOTAL HIP ARTHROPLASTY, RIGHT: ICD-10-CM

## 2024-02-16 DIAGNOSIS — Z47.1 AFTERCARE FOLLOWING LEFT HIP JOINT REPLACEMENT SURGERY: Primary | ICD-10-CM

## 2024-02-16 PROCEDURE — 99024 POSTOP FOLLOW-UP VISIT: CPT | Performed by: STUDENT IN AN ORGANIZED HEALTH CARE EDUCATION/TRAINING PROGRAM

## 2024-02-16 NOTE — PROGRESS NOTES
Subjective: 63 y.o. male presents to the office 5 weeks s/p left anterior total hip arthroplasty performed on 01/10/2024. Patient seen and examined. Pain well- controlled. Progressing very well. He no longer requires an assistive device for ambulation. He has been working to gradually return to all activities without restrictions. 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 Incision without drainage. Denies fevers or chills    Physical Exam:  Incision: clean, dry, and intact  ROM: as expected without pain  5/5 IP/Q/HS/TA/GS, 2+ DP/PT, SILT DP/SP/S/S/TN    No new imaging obtained today    Assessment/Plan:  5 weeks s/p left anterior total hip arthroplasty performed on 01/10/2024   History of right anterior total hip arthroplasty performed on 05/31/2023    - continue multi-modal pain control   - Weight bearing status: as tolerated  - DVT ppx: completed  - Continue PT/OT exercises  - F/U in 6-7 weeks    Scribe Attestation      I,:  Fátima Grimes am acting as a scribe while in the presence of the attending physician.:       I,:  David Calderon DO personally performed the services described in this documentation    as scribed in my presence.:

## 2024-03-29 ENCOUNTER — OFFICE VISIT (OUTPATIENT)
Dept: OBGYN CLINIC | Facility: CLINIC | Age: 64
End: 2024-03-29

## 2024-03-29 VITALS
SYSTOLIC BLOOD PRESSURE: 126 MMHG | HEART RATE: 75 BPM | BODY MASS INDEX: 26.84 KG/M2 | DIASTOLIC BLOOD PRESSURE: 86 MMHG | HEIGHT: 66 IN | WEIGHT: 167 LBS

## 2024-03-29 DIAGNOSIS — Z47.1 AFTERCARE FOLLOWING LEFT HIP JOINT REPLACEMENT SURGERY: Primary | ICD-10-CM

## 2024-03-29 DIAGNOSIS — Z96.642 AFTERCARE FOLLOWING LEFT HIP JOINT REPLACEMENT SURGERY: Primary | ICD-10-CM

## 2024-03-29 PROCEDURE — 99024 POSTOP FOLLOW-UP VISIT: CPT | Performed by: STUDENT IN AN ORGANIZED HEALTH CARE EDUCATION/TRAINING PROGRAM

## 2024-03-29 NOTE — PROGRESS NOTES
Subjective: 63 y.o. male presents to the office 3 months s/p left anterior total hip arthroplasty performed on 01/10/2024. Patient seen and examined. He is not experiencing any pain or limitations at this time. He has returned to all activities without restrictions. Incision without drainage. Denies fevers or chills. He is very happy with his results.     Physical Exam:  Incision: well-healed  ROM: as expected without pain  5/5 IP/Q/HS/TA/GS, 2+ DP/PT, SILT DP/SP/S/S/TN    No new imaging obtained today    Assessment/Plan:  3 months s/p left anterior total hip arthroplasty performed on 01/10/2024   History of right anterior total hip arthroplasty performed on 05/31/2023    - continue multi-modal pain control   - Weight bearing status: as tolerated  - DVT ppx: completed  - Continue PT/OT exercises  - F/U in 9 months for annual check. New x-rays of bilateral hips upon arrival.    Scribe Attestation      I,:  Fátima Grimes am acting as a scribe while in the presence of the attending physician.:       I,:  David Calderon DO personally performed the services described in this documentation    as scribed in my presence.:
